# Patient Record
Sex: MALE | Race: WHITE | NOT HISPANIC OR LATINO | Employment: FULL TIME | URBAN - METROPOLITAN AREA
[De-identification: names, ages, dates, MRNs, and addresses within clinical notes are randomized per-mention and may not be internally consistent; named-entity substitution may affect disease eponyms.]

---

## 2023-02-10 PROBLEM — I10 HYPERTENSION: Status: ACTIVE | Noted: 2023-02-10

## 2023-02-10 PROBLEM — R79.89 ABNORMAL CBC: Status: ACTIVE | Noted: 2023-02-10

## 2023-02-10 PROBLEM — Z90.81 HISTORY OF SPLENECTOMY: Status: ACTIVE | Noted: 2023-02-10

## 2023-02-10 PROBLEM — E66.812 OBESITY, CLASS II, BMI 35-39.9, NO COMORBIDITY: Status: ACTIVE | Noted: 2023-02-10

## 2023-02-10 PROBLEM — N28.9 LOW KIDNEY FUNCTION: Status: ACTIVE | Noted: 2023-02-10

## 2023-02-10 PROBLEM — Z91.199 MEDICALLY NONCOMPLIANT: Status: ACTIVE | Noted: 2023-02-10

## 2023-02-10 PROBLEM — B37.0 ORAL THRUSH: Status: ACTIVE | Noted: 2023-02-10

## 2023-02-10 PROBLEM — B35.3 TINEA PEDIS OF BOTH FEET: Status: ACTIVE | Noted: 2023-02-10

## 2023-02-10 PROBLEM — E78.5 HYPERLIPIDEMIA: Status: ACTIVE | Noted: 2023-02-10

## 2023-02-10 PROBLEM — N18.9 CKD (CHRONIC KIDNEY DISEASE): Status: ACTIVE | Noted: 2023-02-10

## 2023-02-10 PROBLEM — H57.89 IRRITATION OF RIGHT EYE: Status: ACTIVE | Noted: 2023-02-10

## 2023-02-10 PROBLEM — L81.4 SKIN SPOTS-AGING: Status: ACTIVE | Noted: 2023-02-10

## 2023-02-10 PROBLEM — N52.9 ERECTILE DYSFUNCTION: Status: ACTIVE | Noted: 2023-02-10

## 2023-02-10 PROBLEM — E66.9 OBESITY, CLASS II, BMI 35-39.9, NO COMORBIDITY: Status: ACTIVE | Noted: 2023-02-10

## 2023-02-10 PROBLEM — B35.6 TINEA CRURIS: Status: ACTIVE | Noted: 2023-02-10

## 2023-02-10 PROBLEM — C85.90 NON-HODGKIN LYMPHOMA (MULTI): Status: ACTIVE | Noted: 2023-02-10

## 2023-02-10 PROBLEM — E55.9 VITAMIN D DEFICIENCY: Status: ACTIVE | Noted: 2023-02-10

## 2023-02-10 PROBLEM — R74.02 ELEVATED LDH: Status: ACTIVE | Noted: 2023-02-10

## 2023-02-10 RX ORDER — CARVEDILOL 25 MG/1
1 TABLET ORAL 2 TIMES DAILY
COMMUNITY
Start: 2014-05-22 | End: 2023-08-18 | Stop reason: SDUPTHER

## 2023-02-10 RX ORDER — LISINOPRIL 20 MG/1
2 TABLET ORAL DAILY
COMMUNITY
Start: 2014-05-22 | End: 2023-08-18 | Stop reason: SDUPTHER

## 2023-02-10 RX ORDER — ATORVASTATIN CALCIUM 40 MG/1
1 TABLET, FILM COATED ORAL NIGHTLY
COMMUNITY
Start: 2020-03-09 | End: 2023-08-18 | Stop reason: SDUPTHER

## 2023-02-10 RX ORDER — DOXAZOSIN 4 MG/1
1 TABLET ORAL DAILY
COMMUNITY
Start: 2014-05-08 | End: 2023-08-18 | Stop reason: SDUPTHER

## 2023-02-10 RX ORDER — CHLORTHALIDONE 25 MG/1
1 TABLET ORAL DAILY
COMMUNITY
Start: 2014-05-22 | End: 2023-08-18 | Stop reason: SDUPTHER

## 2023-02-10 RX ORDER — AMLODIPINE BESYLATE 10 MG/1
1 TABLET ORAL DAILY
COMMUNITY
Start: 2014-05-22 | End: 2023-08-18 | Stop reason: SDUPTHER

## 2023-04-03 ENCOUNTER — OFFICE VISIT (OUTPATIENT)
Dept: PRIMARY CARE | Facility: CLINIC | Age: 63
End: 2023-04-03
Payer: COMMERCIAL

## 2023-04-03 VITALS
SYSTOLIC BLOOD PRESSURE: 130 MMHG | RESPIRATION RATE: 16 BRPM | HEART RATE: 69 BPM | TEMPERATURE: 97.8 F | OXYGEN SATURATION: 95 % | DIASTOLIC BLOOD PRESSURE: 80 MMHG

## 2023-04-03 DIAGNOSIS — I10 PRIMARY HYPERTENSION: Primary | ICD-10-CM

## 2023-04-03 DIAGNOSIS — E55.9 VITAMIN D DEFICIENCY: ICD-10-CM

## 2023-04-03 DIAGNOSIS — I10 HYPERTENSION, UNSPECIFIED TYPE: ICD-10-CM

## 2023-04-03 LAB
ANION GAP IN SER/PLAS: 14 MMOL/L (ref 10–20)
CALCIUM (MG/DL) IN SER/PLAS: 8.8 MG/DL (ref 8.6–10.3)
CARBON DIOXIDE, TOTAL (MMOL/L) IN SER/PLAS: 25 MMOL/L (ref 21–32)
CHLORIDE (MMOL/L) IN SER/PLAS: 101 MMOL/L (ref 98–107)
CREATININE (MG/DL) IN SER/PLAS: 1.27 MG/DL (ref 0.5–1.3)
GFR MALE: 64 ML/MIN/1.73M2
GLUCOSE (MG/DL) IN SER/PLAS: 91 MG/DL (ref 74–99)
POTASSIUM (MMOL/L) IN SER/PLAS: 3.7 MMOL/L (ref 3.5–5.3)
SODIUM (MMOL/L) IN SER/PLAS: 136 MMOL/L (ref 136–145)
UREA NITROGEN (MG/DL) IN SER/PLAS: 22 MG/DL (ref 6–23)

## 2023-04-03 PROCEDURE — 36415 COLL VENOUS BLD VENIPUNCTURE: CPT | Performed by: NURSE PRACTITIONER

## 2023-04-03 PROCEDURE — 99213 OFFICE O/P EST LOW 20 MIN: CPT | Performed by: NURSE PRACTITIONER

## 2023-04-03 PROCEDURE — 80048 BASIC METABOLIC PNL TOTAL CA: CPT

## 2023-04-03 PROCEDURE — 4004F PT TOBACCO SCREEN RCVD TLK: CPT | Performed by: NURSE PRACTITIONER

## 2023-04-03 PROCEDURE — 3079F DIAST BP 80-89 MM HG: CPT | Performed by: NURSE PRACTITIONER

## 2023-04-03 PROCEDURE — 82306 VITAMIN D 25 HYDROXY: CPT

## 2023-04-03 PROCEDURE — 3075F SYST BP GE 130 - 139MM HG: CPT | Performed by: NURSE PRACTITIONER

## 2023-04-03 NOTE — ASSESSMENT & PLAN NOTE
Well-controlled on amlodipine 10 mg daily, lisinopril 40 mg daily, carvedilol 25 mg twice daily, and chlorthalidone 25 mg daily, check BMP today

## 2023-04-03 NOTE — PROGRESS NOTES
Subjective   Patient ID: Fred Menendez is a 62 y.o. male who presents for Follow-up and Annual Exam.  HPI    Patient presents today for 6-month follow-up for chronic disease management.  He reports he is take his medication as prescribed, denies any side effects.  He does report that he has had increased fatigue over the last several weeks.  He states that he took a new position at work with more responsibility, gets up for work at 3:30 AM and often is not in bed before 9 or 10 PM.  He states that he is sleeping well at night, denies any issues falling or staying asleep.  He denies any chest pains or palpitations, no headache or dizziness, no blurry vision.      Review of Systems  as noted in HPI      /80 (BP Location: Left arm, Patient Position: Sitting, BP Cuff Size: Adult)   Pulse 69   Temp 36.6 °C (97.8 °F) (Temporal)   Resp 16   SpO2 95%      Objective   Physical Exam  Vitals reviewed.   Cardiovascular:      Rate and Rhythm: Normal rate and regular rhythm.      Heart sounds: Normal heart sounds.   Pulmonary:      Effort: Pulmonary effort is normal.      Breath sounds: Normal breath sounds.   Musculoskeletal:      Right lower leg: No edema.      Left lower leg: No edema.   Neurological:      Mental Status: He is alert.         Health Maintenance Due   Topic Date Due    Yearly Adult Physical  Never done    HIV Screening  Never done    Colorectal Cancer Screening  Never done    COVID-19 Vaccine (1) Never done    MMR Vaccines (1 of 1 - Standard series) Never done    HIB Vaccines (1 of 1 - Risk 1-dose series) Never done    Meningococcal Vaccine (1 - Risk 2-dose series) Never done    Hepatitis C Screening  Never done    Diabetes Screening  Never done    Zoster Vaccines (1 of 2) Never done    Pneumococcal Vaccine: Pediatrics (0 to 5 Years) and At-Risk Patients (6 to 64 Years) (3 - PCV) 08/21/2021        Assessment/Plan   Problem List Items Addressed This Visit          Circulatory    Hypertension - Primary      Well-controlled on amlodipine 10 mg daily, lisinopril 40 mg daily, carvedilol 25 mg twice daily, and chlorthalidone 25 mg daily, check BMP today          Relevant Orders    Basic metabolic panel       Endocrine/Metabolic    Vitamin D deficiency    Relevant Orders    Vitamin D 25-Hydroxy,Total            Sherlyn Mckee, APRN-CNP

## 2023-04-04 LAB — CALCIDIOL (25 OH VITAMIN D3) (NG/ML) IN SER/PLAS: 24 NG/ML

## 2023-08-18 ENCOUNTER — OFFICE VISIT (OUTPATIENT)
Dept: PRIMARY CARE | Facility: CLINIC | Age: 63
End: 2023-08-18
Payer: COMMERCIAL

## 2023-08-18 VITALS
BODY MASS INDEX: 36.39 KG/M2 | WEIGHT: 218.38 LBS | SYSTOLIC BLOOD PRESSURE: 132 MMHG | RESPIRATION RATE: 16 BRPM | DIASTOLIC BLOOD PRESSURE: 78 MMHG | OXYGEN SATURATION: 95 % | HEART RATE: 66 BPM | HEIGHT: 65 IN

## 2023-08-18 DIAGNOSIS — I10 HYPERTENSION, UNSPECIFIED TYPE: ICD-10-CM

## 2023-08-18 DIAGNOSIS — F17.210 CIGARETTE NICOTINE DEPENDENCE WITHOUT COMPLICATION: Primary | ICD-10-CM

## 2023-08-18 DIAGNOSIS — E78.2 MODERATE MIXED HYPERLIPIDEMIA NOT REQUIRING STATIN THERAPY: ICD-10-CM

## 2023-08-18 DIAGNOSIS — Z76.89 ESTABLISHING CARE WITH NEW DOCTOR, ENCOUNTER FOR: ICD-10-CM

## 2023-08-18 DIAGNOSIS — Z00.00 WELLNESS EXAMINATION: ICD-10-CM

## 2023-08-18 PROBLEM — C85.90 NON-HODGKIN LYMPHOMA (MULTI): Status: RESOLVED | Noted: 2023-02-10 | Resolved: 2023-08-18

## 2023-08-18 PROCEDURE — 3075F SYST BP GE 130 - 139MM HG: CPT

## 2023-08-18 PROCEDURE — 99396 PREV VISIT EST AGE 40-64: CPT

## 2023-08-18 PROCEDURE — 3078F DIAST BP <80 MM HG: CPT

## 2023-08-18 PROCEDURE — 4004F PT TOBACCO SCREEN RCVD TLK: CPT

## 2023-08-18 RX ORDER — AMLODIPINE BESYLATE 10 MG/1
10 TABLET ORAL DAILY
Qty: 90 TABLET | Refills: 3 | Status: SHIPPED | OUTPATIENT
Start: 2023-08-18 | End: 2024-08-17

## 2023-08-18 RX ORDER — LISINOPRIL 20 MG/1
40 TABLET ORAL DAILY
Qty: 180 TABLET | Refills: 3 | Status: SHIPPED | OUTPATIENT
Start: 2023-08-18 | End: 2024-08-17

## 2023-08-18 RX ORDER — ATORVASTATIN CALCIUM 40 MG/1
40 TABLET, FILM COATED ORAL NIGHTLY
Qty: 90 TABLET | Refills: 3 | Status: SHIPPED | OUTPATIENT
Start: 2023-08-18 | End: 2024-08-17

## 2023-08-18 RX ORDER — CARVEDILOL 25 MG/1
25 TABLET ORAL 2 TIMES DAILY
Qty: 180 TABLET | Refills: 3 | Status: SHIPPED | OUTPATIENT
Start: 2023-08-18 | End: 2024-08-17

## 2023-08-18 RX ORDER — CHLORTHALIDONE 25 MG/1
25 TABLET ORAL DAILY
Qty: 90 TABLET | Refills: 3 | Status: SHIPPED | OUTPATIENT
Start: 2023-08-18 | End: 2024-08-17

## 2023-08-18 RX ORDER — DOXAZOSIN 4 MG/1
4 TABLET ORAL DAILY
Qty: 90 TABLET | Refills: 3 | Status: SHIPPED | OUTPATIENT
Start: 2023-08-18 | End: 2024-08-17

## 2023-08-18 ASSESSMENT — ENCOUNTER SYMPTOMS
FREQUENCY: 0
SORE THROAT: 0
JOINT SWELLING: 0
NERVOUS/ANXIOUS: 0
BLURRED VISION: 0
COUGH: 0
HYPERACTIVE: 0
MYALGIAS: 0
EYE ITCHING: 0
DIZZINESS: 0
ORTHOPNEA: 0
DIARRHEA: 0
HEADACHES: 0
HYPERTENSION: 1
PND: 0
CONSTIPATION: 0
HEMATURIA: 0
SWEATS: 0
VOMITING: 0
WHEEZING: 0
AGITATION: 0
EYE PAIN: 0
SINUS PAIN: 0
ABDOMINAL PAIN: 0
VOICE CHANGE: 0
BLOOD IN STOOL: 0
CHEST TIGHTNESS: 0
SLEEP DISTURBANCE: 0
BACK PAIN: 0
SHORTNESS OF BREATH: 0
DYSPHORIC MOOD: 0
FEVER: 0
SPEECH DIFFICULTY: 0
DIFFICULTY URINATING: 0
WEAKNESS: 0
NUMBNESS: 0
WOUND: 0
FATIGUE: 0
NECK PAIN: 0
PALPITATIONS: 0
RHINORRHEA: 0
SINUS PRESSURE: 0
EYE DISCHARGE: 0

## 2023-08-18 ASSESSMENT — PATIENT HEALTH QUESTIONNAIRE - PHQ9
1. LITTLE INTEREST OR PLEASURE IN DOING THINGS: NOT AT ALL
2. FEELING DOWN, DEPRESSED OR HOPELESS: NOT AT ALL
SUM OF ALL RESPONSES TO PHQ9 QUESTIONS 1 AND 2: 0

## 2023-08-18 ASSESSMENT — COLUMBIA-SUICIDE SEVERITY RATING SCALE - C-SSRS
2. HAVE YOU ACTUALLY HAD ANY THOUGHTS OF KILLING YOURSELF?: NO
6. HAVE YOU EVER DONE ANYTHING, STARTED TO DO ANYTHING, OR PREPARED TO DO ANYTHING TO END YOUR LIFE?: NO
1. IN THE PAST MONTH, HAVE YOU WISHED YOU WERE DEAD OR WISHED YOU COULD GO TO SLEEP AND NOT WAKE UP?: NO

## 2023-08-18 NOTE — PATIENT INSTRUCTIONS
Today we spend 3 minutes discussing that smoking reduces your immune system, your ability to heal, thins your bones, increases your risk of cancer (lung, bladder, breast, cervical, etc.), increases your risk of lung disease (COPD and emphysema), and greatly increases your risk of heart attack and stroke, as well as affects those around you (increased ear infections and asthma in children of smokers).2) We briefly discussed options to increase chance of quitting, including nicotine replacement, Chantix (varenicline tartrate) and Zyban (buproprion hydrochloride). 3) Patient is not currently interested in quitting. Will address again at next visit. 4) Please visit CDC.gov/Tobacco for more details.    Vaccines are important!  Yearly seasonal flu shots are recommended, high dose is indicated for patient over 65.   - Tetanus boosters should be given every 10 yrs, this also covers for diphtheria and pertussis.   - most insurances cover the 2-shot series for shingles (shingrix) after the age of 60.   - Pneumonia vaccines are given routinely at age 65, however is you have a chronic heart or lung diagnosis this may be given earlier.     Preventative cancer screens SAVE LIVES!  - Prostate cancer screening is included in yearly blood work in men over 40.   - Colon cancer screening is recommended at age  for all patients, sometimes sooner based on family history.   - other tests may be recommended if you are a smoker!     150 mins of aerobic exercise is advised for good cardiovascular health and maintain a healthy weight.     Please try to work on maintaining a healthy body weight, with a BMI close to or at a BMI 19-25.    Recommend a whole-foods, plant-based diet, filled with fresh fruits, vegetables, seeds, beans, nuts and berries.    Discussed smoking cessation/Abstinence is best.      Abstaining from the use of alcohol is best. However if you choose to drink current guidelines are 2 or less drinks per day for men and 1.5 or  less per day for women (and not all saved for one night on the weekend).    Your blood pressure should be BELOW 135/85.  If your readings were high today, please consider an at home blood pressure monitor to keep a log to bring with you to your next appointment.     OF course, do not text and drive and always wear a seat belt.

## 2023-08-18 NOTE — PROGRESS NOTES
Subjective   Patient ID: Fred Menendez is a 62 y.o. male who presents for Establish Care, Pre-op Exam, and Annual Exam.    Pt is here for annual wellness.  Reports overall health is well, need eye surgery    Do you take any herbs or supplements that were not prescribed by a doctor? Yes hemp extract daily for pain  Are you taking calcium supplements? no  Are you taking aspirin daily? no  Colonoscopy: 5/1/2020 10yr followup  Fasting blood work: 4/23  Last eye exam: 1/23  Last dental Exam: unkown  Exercise:none  Mood:pleasant  Sleep: pretty good  Diet:  could be better  Occupation:  [employer]   Do you have pain that bothers you in your daily life? yes    1. Patient Counseling:  --Nutrition: Stressed importance of moderation in sodium/caffeine intake, saturated fat and cholesterol, caloric balance, sufficient intake of fresh fruits, vegetables, fiber, calcium, iron, and 1 mg of folate supplement per day (for females capable of pregnancy).  --Discussed the issue of estrogen replacement, calcium supplement, and the daily use of baby aspirin.  --Exercise: Stressed the importance of regular exercise.   --Substance Abuse: Discussed cessation/primary prevention of tobacco, alcohol, or other drug use; driving or other dangerous activities under the influence; availability of treatment for abuse.    --Sexuality: Discussed sexually transmitted diseases, partner selection, use of condoms, avoidance of unintended pregnancy  and contraceptive alternatives.   --Injury prevention: Discussed safety belts, safety helmets, smoke detector, smoking near bedding or upholstery.   --Dental health: Discussed importance of regular tooth brushing, flossing, and dental visits.  --Immunizations reviewed.  --Discussed benefits of screening colonoscopy.  --After hours service discussed with patient  2 Discussed the patient's BMI with her.  The BMI is above average. The patient received Provided instructions on dietary  changes  Provided instructions on exercise because they have an above normal BMI.  3 Follow up as needed for acute illness    Subjective  Fred Menendez is a 62 y.o. male who presents to the office today for a preoperative consultation at the request of surgeon Dr sandy who plans on performing eye suregy on unknown unknown. This consultation is requested for the specific conditions prompting preoperative evaluation (i.e. because of potential effect on operative risk): htn, smoking. Planned anesthesia: IV sedation. The patient has the following known anesthesia issues: none. Patients bleeding risk: no recent abnormal bleeding. Patient does not have objections to receiving blood products if needed.    Predictors of intubation difficulty:   Morbid obesity? no   Anatomically abnormal facies? no   Prominent incisors? no   Receding mandible? no   Short, thick neck? no   Neck range of motion: normal    Current medications which may produce withdrawal symptoms if withheld perioperatively: none            Hypertension  This is a chronic problem. The current episode started more than 1 year ago (22yrs brain). The problem has been waxing and waning since onset. The problem is controlled. Pertinent negatives include no anxiety, blurred vision, chest pain, headaches, malaise/fatigue, neck pain, orthopnea, palpitations, peripheral edema, PND, shortness of breath or sweats. There are no associated agents to hypertension. Risk factors for coronary artery disease include male gender, stress, obesity and smoking/tobacco exposure. Past treatments include ACE inhibitors, diuretics and calcium channel blockers. The current treatment provides significant improvement. There are no compliance problems.         Review of Systems   Constitutional:  Negative for fatigue, fever and malaise/fatigue.   HENT:  Negative for congestion, ear discharge, ear pain, nosebleeds, postnasal drip, rhinorrhea, sinus pressure, sinus pain, sore throat,  "tinnitus and voice change.    Eyes:  Negative for blurred vision, pain, discharge and itching.   Respiratory:  Negative for cough, chest tightness, shortness of breath and wheezing.    Cardiovascular:  Negative for chest pain, palpitations, orthopnea, leg swelling and PND.   Gastrointestinal:  Negative for abdominal pain, blood in stool, constipation, diarrhea and vomiting.   Endocrine: Negative for cold intolerance, heat intolerance and polyuria.   Genitourinary:  Negative for difficulty urinating, frequency, hematuria, penile pain, penile swelling, scrotal swelling, testicular pain and urgency.   Musculoskeletal:  Negative for back pain, gait problem, joint swelling, myalgias and neck pain.   Skin:  Negative for rash and wound.   Neurological:  Negative for dizziness, speech difficulty, weakness, numbness and headaches.   Psychiatric/Behavioral:  Negative for agitation, dysphoric mood and sleep disturbance. The patient is not nervous/anxious and is not hyperactive.        Objective   /78   Pulse 66   Resp 16   Ht 1.651 m (5' 5\")   Wt 99.1 kg (218 lb 6 oz)   SpO2 95%   BMI 36.34 kg/m²     Physical Exam  Constitutional:       Appearance: Normal appearance.   HENT:      Head: Normocephalic and atraumatic.      Right Ear: Tympanic membrane and external ear normal.      Left Ear: Tympanic membrane and external ear normal.      Nose: Nose normal.      Mouth/Throat:      Mouth: Mucous membranes are moist.      Pharynx: Oropharynx is clear. Uvula midline.   Eyes:      General: Lids are normal.      Extraocular Movements: Extraocular movements intact.      Pupils: Pupils are equal, round, and reactive to light.   Neck:      Thyroid: No thyromegaly or thyroid tenderness.   Cardiovascular:      Rate and Rhythm: Normal rate and regular rhythm.      Heart sounds: Normal heart sounds.   Pulmonary:      Effort: Pulmonary effort is normal.      Breath sounds: Normal breath sounds.   Abdominal:      General: Bowel " sounds are normal.      Palpations: Abdomen is soft.      Tenderness: There is no abdominal tenderness. There is no guarding.   Musculoskeletal:         General: No swelling. Normal range of motion.      Cervical back: Normal range of motion.      Right lower leg: No edema.      Left lower leg: No edema.   Lymphadenopathy:      Head:      Right side of head: No submental, submandibular or tonsillar adenopathy.      Left side of head: No submental, submandibular or tonsillar adenopathy.      Cervical: No cervical adenopathy.   Skin:     General: Skin is warm and dry.      Capillary Refill: Capillary refill takes less than 2 seconds.      Coloration: Skin is not jaundiced.      Findings: No lesion or rash.   Neurological:      General: No focal deficit present.      Mental Status: He is alert and oriented to person, place, and time.   Psychiatric:         Mood and Affect: Mood normal.         Behavior: Behavior normal.         Thought Content: Thought content normal.         Judgment: Judgment normal.       Assessment/Plan   Problem List Items Addressed This Visit       Hyperlipidemia    Relevant Medications    atorvastatin (Lipitor) 40 mg tablet    Hypertension    Relevant Medications    lisinopril 20 mg tablet    amLODIPine (Norvasc) 10 mg tablet    chlorthalidone (Hygroton) 25 mg tablet    doxazosin (Cardura) 4 mg tablet    carvedilol (Coreg) 25 mg tablet     Other Visit Diagnoses       Cigarette nicotine dependence without complication    -  Primary    Relevant Orders    CT lung screening low dose    Wellness examination        Establishing care with new doctor, encounter for            Pt is medically cleared to have eye surgery

## 2023-08-31 ENCOUNTER — TELEPHONE (OUTPATIENT)
Dept: PRIMARY CARE | Facility: CLINIC | Age: 63
End: 2023-08-31
Payer: COMMERCIAL

## 2023-08-31 NOTE — TELEPHONE ENCOUNTER
Odette called in to confirm if Pt's paperwork was faxed over paperwork to eye center for surgery.    Confirmed with Odette that paperwork was faxed on 3/29/2023.

## 2024-01-12 ENCOUNTER — HOSPITAL ENCOUNTER (OUTPATIENT)
Facility: HOSPITAL | Age: 64
Setting detail: OBSERVATION
Discharge: HOME | DRG: 175 | End: 2024-01-14
Attending: EMERGENCY MEDICINE | Admitting: STUDENT IN AN ORGANIZED HEALTH CARE EDUCATION/TRAINING PROGRAM
Payer: COMMERCIAL

## 2024-01-12 ENCOUNTER — APPOINTMENT (OUTPATIENT)
Dept: RADIOLOGY | Facility: HOSPITAL | Age: 64
DRG: 175 | End: 2024-01-12
Payer: COMMERCIAL

## 2024-01-12 ENCOUNTER — APPOINTMENT (OUTPATIENT)
Dept: CARDIOLOGY | Facility: HOSPITAL | Age: 64
DRG: 175 | End: 2024-01-12
Payer: COMMERCIAL

## 2024-01-12 DIAGNOSIS — I26.99 PE (PULMONARY THROMBOEMBOLISM) (MULTI): Primary | ICD-10-CM

## 2024-01-12 DIAGNOSIS — J18.9 PNEUMONIA OF LEFT UPPER LOBE DUE TO INFECTIOUS ORGANISM: ICD-10-CM

## 2024-01-12 PROBLEM — J96.01 ACUTE HYPOXIC RESPIRATORY FAILURE (MULTI): Status: ACTIVE | Noted: 2024-01-12

## 2024-01-12 LAB
ALBUMIN SERPL BCP-MCNC: 3.3 G/DL (ref 3.4–5)
ALP SERPL-CCNC: 79 U/L (ref 33–136)
ALT SERPL W P-5'-P-CCNC: 42 U/L (ref 10–52)
ANION GAP SERPL CALC-SCNC: 14 MMOL/L (ref 10–20)
AST SERPL W P-5'-P-CCNC: 45 U/L (ref 9–39)
BASE EXCESS BLDV CALC-SCNC: 4.3 MMOL/L (ref -2–3)
BASOPHILS # BLD AUTO: 0.03 X10*3/UL (ref 0–0.1)
BASOPHILS NFR BLD AUTO: 0.1 %
BILIRUB SERPL-MCNC: 1.4 MG/DL (ref 0–1.2)
BNP SERPL-MCNC: 247 PG/ML (ref 0–99)
BODY TEMPERATURE: 37 DEGREES CELSIUS
BUN SERPL-MCNC: 43 MG/DL (ref 6–23)
CALCIUM SERPL-MCNC: 8.9 MG/DL (ref 8.6–10.3)
CARDIAC TROPONIN I PNL SERPL HS: 43 NG/L (ref 0–20)
CHLORIDE SERPL-SCNC: 99 MMOL/L (ref 98–107)
CO2 SERPL-SCNC: 26 MMOL/L (ref 21–32)
CREAT SERPL-MCNC: 1.79 MG/DL (ref 0.5–1.3)
EGFRCR SERPLBLD CKD-EPI 2021: 42 ML/MIN/1.73M*2
EOSINOPHIL # BLD AUTO: 0.04 X10*3/UL (ref 0–0.7)
EOSINOPHIL NFR BLD AUTO: 0.2 %
ERYTHROCYTE [DISTWIDTH] IN BLOOD BY AUTOMATED COUNT: 14.5 % (ref 11.5–14.5)
FLUAV RNA RESP QL NAA+PROBE: NOT DETECTED
FLUBV RNA RESP QL NAA+PROBE: NOT DETECTED
GLUCOSE SERPL-MCNC: 126 MG/DL (ref 74–99)
HCO3 BLDV-SCNC: 27.3 MMOL/L (ref 22–26)
HCT VFR BLD AUTO: 40.9 % (ref 41–52)
HGB BLD-MCNC: 14.4 G/DL (ref 13.5–17.5)
HOLD SPECIMEN: NORMAL
IMM GRANULOCYTES # BLD AUTO: 0.15 X10*3/UL (ref 0–0.7)
IMM GRANULOCYTES NFR BLD AUTO: 0.7 % (ref 0–0.9)
INHALED O2 CONCENTRATION: 21 %
LYMPHOCYTES # BLD AUTO: 1.86 X10*3/UL (ref 1.2–4.8)
LYMPHOCYTES NFR BLD AUTO: 9.3 %
MCH RBC QN AUTO: 32 PG (ref 26–34)
MCHC RBC AUTO-ENTMCNC: 35.2 G/DL (ref 32–36)
MCV RBC AUTO: 91 FL (ref 80–100)
MONOCYTES # BLD AUTO: 2 X10*3/UL (ref 0.1–1)
MONOCYTES NFR BLD AUTO: 10 %
NEUTROPHILS # BLD AUTO: 15.93 X10*3/UL (ref 1.2–7.7)
NEUTROPHILS NFR BLD AUTO: 79.7 %
NRBC BLD-RTO: 0 /100 WBCS (ref 0–0)
OXYHGB MFR BLDV: 82.7 % (ref 45–75)
PCO2 BLDV: 35 MM HG (ref 41–51)
PH BLDV: 7.5 PH (ref 7.33–7.43)
PLATELET # BLD AUTO: 154 X10*3/UL (ref 150–450)
PO2 BLDV: 51 MM HG (ref 35–45)
POTASSIUM SERPL-SCNC: 3.3 MMOL/L (ref 3.5–5.3)
PROT SERPL-MCNC: 7.2 G/DL (ref 6.4–8.2)
RBC # BLD AUTO: 4.5 X10*6/UL (ref 4.5–5.9)
RSV RNA RESP QL NAA+PROBE: NOT DETECTED
SAO2 % BLDV: 86 % (ref 45–75)
SARS-COV-2 RNA RESP QL NAA+PROBE: NOT DETECTED
SODIUM SERPL-SCNC: 136 MMOL/L (ref 136–145)
UFH PPP CHRO-ACNC: 0.5 IU/ML
UFH PPP CHRO-ACNC: 0.7 IU/ML
WBC # BLD AUTO: 20 X10*3/UL (ref 4.4–11.3)

## 2024-01-12 PROCEDURE — 87040 BLOOD CULTURE FOR BACTERIA: CPT | Mod: GEALAB | Performed by: PHYSICIAN ASSISTANT

## 2024-01-12 PROCEDURE — 85025 COMPLETE CBC W/AUTO DIFF WBC: CPT | Performed by: PHYSICIAN ASSISTANT

## 2024-01-12 PROCEDURE — 71275 CT ANGIOGRAPHY CHEST: CPT | Mod: FOREIGN READ | Performed by: RADIOLOGY

## 2024-01-12 PROCEDURE — G0378 HOSPITAL OBSERVATION PER HR: HCPCS

## 2024-01-12 PROCEDURE — 96374 THER/PROPH/DIAG INJ IV PUSH: CPT | Mod: 59

## 2024-01-12 PROCEDURE — 2550000001 HC RX 255 CONTRASTS: Performed by: PHYSICIAN ASSISTANT

## 2024-01-12 PROCEDURE — 2500000001 HC RX 250 WO HCPCS SELF ADMINISTERED DRUGS (ALT 637 FOR MEDICARE OP): Performed by: STUDENT IN AN ORGANIZED HEALTH CARE EDUCATION/TRAINING PROGRAM

## 2024-01-12 PROCEDURE — 99285 EMERGENCY DEPT VISIT HI MDM: CPT | Performed by: EMERGENCY MEDICINE

## 2024-01-12 PROCEDURE — 99223 1ST HOSP IP/OBS HIGH 75: CPT | Performed by: STUDENT IN AN ORGANIZED HEALTH CARE EDUCATION/TRAINING PROGRAM

## 2024-01-12 PROCEDURE — 83880 ASSAY OF NATRIURETIC PEPTIDE: CPT | Performed by: PHYSICIAN ASSISTANT

## 2024-01-12 PROCEDURE — 2500000002 HC RX 250 W HCPCS SELF ADMINISTERED DRUGS (ALT 637 FOR MEDICARE OP, ALT 636 FOR OP/ED): Performed by: STUDENT IN AN ORGANIZED HEALTH CARE EDUCATION/TRAINING PROGRAM

## 2024-01-12 PROCEDURE — 85520 HEPARIN ASSAY: CPT | Performed by: STUDENT IN AN ORGANIZED HEALTH CARE EDUCATION/TRAINING PROGRAM

## 2024-01-12 PROCEDURE — 1200000002 HC GENERAL ROOM WITH TELEMETRY DAILY

## 2024-01-12 PROCEDURE — 2500000004 HC RX 250 GENERAL PHARMACY W/ HCPCS (ALT 636 FOR OP/ED)

## 2024-01-12 PROCEDURE — 80053 COMPREHEN METABOLIC PANEL: CPT | Performed by: PHYSICIAN ASSISTANT

## 2024-01-12 PROCEDURE — 82805 BLOOD GASES W/O2 SATURATION: CPT | Performed by: PHYSICIAN ASSISTANT

## 2024-01-12 PROCEDURE — 2500000004 HC RX 250 GENERAL PHARMACY W/ HCPCS (ALT 636 FOR OP/ED): Performed by: STUDENT IN AN ORGANIZED HEALTH CARE EDUCATION/TRAINING PROGRAM

## 2024-01-12 PROCEDURE — 2500000004 HC RX 250 GENERAL PHARMACY W/ HCPCS (ALT 636 FOR OP/ED): Performed by: PHYSICIAN ASSISTANT

## 2024-01-12 PROCEDURE — 93005 ELECTROCARDIOGRAM TRACING: CPT

## 2024-01-12 PROCEDURE — 2500000001 HC RX 250 WO HCPCS SELF ADMINISTERED DRUGS (ALT 637 FOR MEDICARE OP): Performed by: PHYSICIAN ASSISTANT

## 2024-01-12 PROCEDURE — 84484 ASSAY OF TROPONIN QUANT: CPT | Performed by: PHYSICIAN ASSISTANT

## 2024-01-12 PROCEDURE — 96365 THER/PROPH/DIAG IV INF INIT: CPT

## 2024-01-12 PROCEDURE — 71046 X-RAY EXAM CHEST 2 VIEWS: CPT | Mod: FOREIGN READ | Performed by: RADIOLOGY

## 2024-01-12 PROCEDURE — 87637 SARSCOV2&INF A&B&RSV AMP PRB: CPT | Performed by: PHYSICIAN ASSISTANT

## 2024-01-12 PROCEDURE — 96375 TX/PRO/DX INJ NEW DRUG ADDON: CPT

## 2024-01-12 PROCEDURE — 71046 X-RAY EXAM CHEST 2 VIEWS: CPT | Mod: FR

## 2024-01-12 PROCEDURE — 96367 TX/PROPH/DG ADDL SEQ IV INF: CPT

## 2024-01-12 PROCEDURE — S4991 NICOTINE PATCH NONLEGEND: HCPCS | Performed by: STUDENT IN AN ORGANIZED HEALTH CARE EDUCATION/TRAINING PROGRAM

## 2024-01-12 PROCEDURE — 71275 CT ANGIOGRAPHY CHEST: CPT | Mod: FR

## 2024-01-12 PROCEDURE — 36415 COLL VENOUS BLD VENIPUNCTURE: CPT | Performed by: PHYSICIAN ASSISTANT

## 2024-01-12 PROCEDURE — A4217 STERILE WATER/SALINE, 500 ML: HCPCS | Performed by: STUDENT IN AN ORGANIZED HEALTH CARE EDUCATION/TRAINING PROGRAM

## 2024-01-12 RX ORDER — HEPARIN SODIUM 5000 [USP'U]/ML
3000-6000 INJECTION, SOLUTION INTRAVENOUS; SUBCUTANEOUS EVERY 4 HOURS PRN
Status: DISCONTINUED | OUTPATIENT
Start: 2024-01-12 | End: 2024-01-14

## 2024-01-12 RX ORDER — CEFTRIAXONE 2 G/50ML
2 INJECTION, SOLUTION INTRAVENOUS EVERY 24 HOURS
Status: DISCONTINUED | OUTPATIENT
Start: 2024-01-12 | End: 2024-01-14 | Stop reason: HOSPADM

## 2024-01-12 RX ORDER — ACETAMINOPHEN 500 MG
5 TABLET ORAL NIGHTLY PRN
Status: DISCONTINUED | OUTPATIENT
Start: 2024-01-12 | End: 2024-01-14 | Stop reason: HOSPADM

## 2024-01-12 RX ORDER — CARVEDILOL 25 MG/1
25 TABLET ORAL 2 TIMES DAILY
Status: DISCONTINUED | OUTPATIENT
Start: 2024-01-12 | End: 2024-01-14 | Stop reason: HOSPADM

## 2024-01-12 RX ORDER — AMOXICILLIN 250 MG
2 CAPSULE ORAL NIGHTLY PRN
Status: DISCONTINUED | OUTPATIENT
Start: 2024-01-12 | End: 2024-01-14 | Stop reason: HOSPADM

## 2024-01-12 RX ORDER — DOXAZOSIN 4 MG/1
4 TABLET ORAL DAILY
Status: DISCONTINUED | OUTPATIENT
Start: 2024-01-12 | End: 2024-01-14 | Stop reason: HOSPADM

## 2024-01-12 RX ORDER — AMLODIPINE BESYLATE 10 MG/1
10 TABLET ORAL DAILY
Status: DISCONTINUED | OUTPATIENT
Start: 2024-01-12 | End: 2024-01-14 | Stop reason: HOSPADM

## 2024-01-12 RX ORDER — IBUPROFEN 200 MG
TABLET ORAL
Status: DISPENSED
Start: 2024-01-12 | End: 2024-01-13

## 2024-01-12 RX ORDER — POTASSIUM CHLORIDE 1.5 G/1.58G
40 POWDER, FOR SOLUTION ORAL ONCE
Status: COMPLETED | OUTPATIENT
Start: 2024-01-12 | End: 2024-01-12

## 2024-01-12 RX ORDER — CHLORTHALIDONE 25 MG/1
25 TABLET ORAL DAILY
Status: DISCONTINUED | OUTPATIENT
Start: 2024-01-12 | End: 2024-01-14 | Stop reason: HOSPADM

## 2024-01-12 RX ORDER — CEFTRIAXONE 2 G/50ML
INJECTION, SOLUTION INTRAVENOUS
Status: COMPLETED
Start: 2024-01-12 | End: 2024-01-12

## 2024-01-12 RX ORDER — ONDANSETRON HYDROCHLORIDE 2 MG/ML
4 INJECTION, SOLUTION INTRAVENOUS EVERY 6 HOURS PRN
Status: DISCONTINUED | OUTPATIENT
Start: 2024-01-12 | End: 2024-01-14 | Stop reason: HOSPADM

## 2024-01-12 RX ORDER — IBUPROFEN 200 MG
1 TABLET ORAL DAILY
Status: DISCONTINUED | OUTPATIENT
Start: 2024-01-12 | End: 2024-01-13

## 2024-01-12 RX ORDER — HEPARIN SODIUM 10000 [USP'U]/100ML
0-4500 INJECTION, SOLUTION INTRAVENOUS CONTINUOUS
Status: DISCONTINUED | OUTPATIENT
Start: 2024-01-12 | End: 2024-01-14

## 2024-01-12 RX ORDER — FAMOTIDINE 20 MG/1
10 TABLET, FILM COATED ORAL 2 TIMES DAILY PRN
Status: DISCONTINUED | OUTPATIENT
Start: 2024-01-12 | End: 2024-01-14 | Stop reason: HOSPADM

## 2024-01-12 RX ORDER — LISINOPRIL 20 MG/1
40 TABLET ORAL DAILY
Status: DISCONTINUED | OUTPATIENT
Start: 2024-01-12 | End: 2024-01-14 | Stop reason: HOSPADM

## 2024-01-12 RX ORDER — HEPARIN SODIUM 5000 [USP'U]/ML
80 INJECTION, SOLUTION INTRAVENOUS; SUBCUTANEOUS ONCE
Status: COMPLETED | OUTPATIENT
Start: 2024-01-12 | End: 2024-01-12

## 2024-01-12 RX ORDER — ATORVASTATIN CALCIUM 40 MG/1
40 TABLET, FILM COATED ORAL NIGHTLY
Status: DISCONTINUED | OUTPATIENT
Start: 2024-01-12 | End: 2024-01-14 | Stop reason: HOSPADM

## 2024-01-12 RX ADMIN — HEPARIN SODIUM 7500 UNITS: 5000 INJECTION INTRAVENOUS; SUBCUTANEOUS at 12:43

## 2024-01-12 RX ADMIN — IOHEXOL 77 ML: 350 INJECTION, SOLUTION INTRAVENOUS at 11:47

## 2024-01-12 RX ADMIN — HEPARIN SODIUM 1700 UNITS/HR: 10000 INJECTION, SOLUTION INTRAVENOUS at 12:44

## 2024-01-12 RX ADMIN — VANCOMYCIN HYDROCHLORIDE 1250 MG: 10 INJECTION, POWDER, LYOPHILIZED, FOR SOLUTION INTRAVENOUS at 17:00

## 2024-01-12 RX ADMIN — NICOTINE 1 PATCH: 14 PATCH, EXTENDED RELEASE TRANSDERMAL at 15:05

## 2024-01-12 RX ADMIN — HEPARIN SODIUM 1700 UNITS/HR: 10000 INJECTION, SOLUTION INTRAVENOUS at 23:01

## 2024-01-12 RX ADMIN — POTASSIUM CHLORIDE 40 MEQ: 1.5 POWDER, FOR SOLUTION ORAL at 15:21

## 2024-01-12 RX ADMIN — CEFTRIAXONE SODIUM 2 G: 2 INJECTION, SOLUTION INTRAVENOUS at 10:31

## 2024-01-12 RX ADMIN — ATORVASTATIN CALCIUM 40 MG: 40 TABLET, FILM COATED ORAL at 20:29

## 2024-01-12 RX ADMIN — AZITHROMYCIN MONOHYDRATE 500 MG: 500 INJECTION, POWDER, LYOPHILIZED, FOR SOLUTION INTRAVENOUS at 11:12

## 2024-01-12 SDOH — SOCIAL STABILITY: SOCIAL INSECURITY: HAVE YOU HAD THOUGHTS OF HARMING ANYONE ELSE?: NO

## 2024-01-12 SDOH — SOCIAL STABILITY: SOCIAL INSECURITY: DO YOU FEEL UNSAFE GOING BACK TO THE PLACE WHERE YOU ARE LIVING?: NO

## 2024-01-12 SDOH — SOCIAL STABILITY: SOCIAL INSECURITY: ARE YOU OR HAVE YOU BEEN THREATENED OR ABUSED PHYSICALLY, EMOTIONALLY, OR SEXUALLY BY ANYONE?: NO

## 2024-01-12 SDOH — SOCIAL STABILITY: SOCIAL INSECURITY: ABUSE: ADULT

## 2024-01-12 SDOH — SOCIAL STABILITY: SOCIAL INSECURITY: DO YOU FEEL ANYONE HAS EXPLOITED OR TAKEN ADVANTAGE OF YOU FINANCIALLY OR OF YOUR PERSONAL PROPERTY?: NO

## 2024-01-12 SDOH — SOCIAL STABILITY: SOCIAL INSECURITY: ARE THERE ANY APPARENT SIGNS OF INJURIES/BEHAVIORS THAT COULD BE RELATED TO ABUSE/NEGLECT?: NO

## 2024-01-12 SDOH — SOCIAL STABILITY: SOCIAL INSECURITY: HAS ANYONE EVER THREATENED TO HURT YOUR FAMILY OR YOUR PETS?: NO

## 2024-01-12 SDOH — SOCIAL STABILITY: SOCIAL INSECURITY: DOES ANYONE TRY TO KEEP YOU FROM HAVING/CONTACTING OTHER FRIENDS OR DOING THINGS OUTSIDE YOUR HOME?: NO

## 2024-01-12 ASSESSMENT — LIFESTYLE VARIABLES
HAS A RELATIVE, FRIEND, DOCTOR, OR ANOTHER HEALTH PROFESSIONAL EXPRESSED CONCERN ABOUT YOUR DRINKING OR SUGGESTED YOU CUT DOWN: NO
HOW MANY STANDARD DRINKS CONTAINING ALCOHOL DO YOU HAVE ON A TYPICAL DAY: 1 OR 2
SKIP TO QUESTIONS 9-10: 1
HOW OFTEN DO YOU HAVE A DRINK CONTAINING ALCOHOL: 4 OR MORE TIMES A WEEK
HOW OFTEN DURING THE LAST YEAR HAVE YOU NEEDED AN ALCOHOLIC DRINK FIRST THING IN THE MORNING TO GET YOURSELF GOING AFTER A NIGHT OF HEAVY DRINKING: NEVER
AUDIT TOTAL SCORE: 4
HOW OFTEN DO YOU HAVE 6 OR MORE DRINKS ON ONE OCCASION: NEVER
AUDIT TOTAL SCORE: 0
HAVE YOU OR SOMEONE ELSE BEEN INJURED AS A RESULT OF YOUR DRINKING: NO
AUDIT-C TOTAL SCORE: 4
HOW OFTEN DURING THE LAST YEAR HAVE YOU BEEN UNABLE TO REMEMBER WHAT HAPPENED THE NIGHT BEFORE BECAUSE YOU HAD BEEN DRINKING: NEVER
HOW OFTEN DURING THE LAST YEAR HAVE YOU HAD A FEELING OF GUILT OR REMORSE AFTER DRINKING: NEVER
AUDIT-C TOTAL SCORE: 4
HOW OFTEN DURING THE LAST YEAR HAVE YOU FAILED TO DO WHAT WAS NORMALLY EXPECTED FROM YOU BECAUSE OF DRINKING: NEVER
PRESCIPTION_ABUSE_PAST_12_MONTHS: YES
SUBSTANCE_ABUSE_PAST_12_MONTHS: YES

## 2024-01-12 ASSESSMENT — COGNITIVE AND FUNCTIONAL STATUS - GENERAL
DAILY ACTIVITIY SCORE: 24
PATIENT BASELINE BEDBOUND: NO
MOBILITY SCORE: 24
DAILY ACTIVITIY SCORE: 24
MOBILITY SCORE: 24

## 2024-01-12 ASSESSMENT — ACTIVITIES OF DAILY LIVING (ADL)
TOILETING: INDEPENDENT
BATHING: INDEPENDENT
FEEDING YOURSELF: INDEPENDENT
PATIENT'S MEMORY ADEQUATE TO SAFELY COMPLETE DAILY ACTIVITIES?: YES
LACK_OF_TRANSPORTATION: NO
HEARING - RIGHT EAR: FUNCTIONAL
JUDGMENT_ADEQUATE_SAFELY_COMPLETE_DAILY_ACTIVITIES: YES
DRESSING YOURSELF: INDEPENDENT
WALKS IN HOME: INDEPENDENT
ADEQUATE_TO_COMPLETE_ADL: YES
ASSISTIVE_DEVICE: EYEGLASSES
GROOMING: INDEPENDENT
HEARING - LEFT EAR: FUNCTIONAL

## 2024-01-12 ASSESSMENT — PAIN - FUNCTIONAL ASSESSMENT
PAIN_FUNCTIONAL_ASSESSMENT: 0-10

## 2024-01-12 ASSESSMENT — COLUMBIA-SUICIDE SEVERITY RATING SCALE - C-SSRS
6. HAVE YOU EVER DONE ANYTHING, STARTED TO DO ANYTHING, OR PREPARED TO DO ANYTHING TO END YOUR LIFE?: NO
1. IN THE PAST MONTH, HAVE YOU WISHED YOU WERE DEAD OR WISHED YOU COULD GO TO SLEEP AND NOT WAKE UP?: NO
2. HAVE YOU ACTUALLY HAD ANY THOUGHTS OF KILLING YOURSELF?: NO

## 2024-01-12 ASSESSMENT — PAIN SCALES - GENERAL
PAINLEVEL_OUTOF10: 0 - NO PAIN
PAINLEVEL_OUTOF10: 3

## 2024-01-12 ASSESSMENT — PAIN DESCRIPTION - LOCATION: LOCATION: RIB CAGE

## 2024-01-12 ASSESSMENT — PATIENT HEALTH QUESTIONNAIRE - PHQ9
2. FEELING DOWN, DEPRESSED OR HOPELESS: NOT AT ALL
SUM OF ALL RESPONSES TO PHQ9 QUESTIONS 1 & 2: 0
1. LITTLE INTEREST OR PLEASURE IN DOING THINGS: NOT AT ALL

## 2024-01-12 NOTE — PROGRESS NOTES
"Vancomycin Dosing by Pharmacy- INITIAL    Fred Menendez is a 63 y.o. year old male who Pharmacy has been consulted for vancomycin dosing for pneumonia. Based on the patient's indication and renal status this patient will be dosed based on a goal AUC of 400-600.     Renal function is currently stable. Current Scr 1.79; generally trends high.    Visit Vitals  /80   Pulse 90   Temp 37.4 °C (99.3 °F)   Resp 18        Lab Results   Component Value Date    CREATININE 1.79 (H) 01/12/2024    CREATININE 1.27 04/03/2023    CREATININE 1.30 04/22/2022    CREATININE 1.5 04/21/2021    CREATININE 1.72 (H) 03/31/2021        Patient weight is No results found for: \"PTWEIGHT\"    No results found for: \"CULTURE\"     No intake/output data recorded.  [unfilled]    Lab Results   Component Value Date    PATIENTTEMP 37.0 01/12/2024          Assessment/Plan     Patient will not be given a loading dose.  Will initiate vancomycin maintenance,  1250 mg every 24 hours.    This dosing regimen is predicted by [employer] to result in the following pharmacokinetic parameters:    Loading dose: N/A  Regimen: 1250 mg IV every 24 hours.  Start time: 16:23 on 01/12/2024  Exposure target: AUC24 (range)400-600 mg/L.hr   AUC24,ss: 474 mg/L.hr  Probability of AUC24 > 400: 69 %  Ctrough,ss: 14.8 mg/L  Probability of Ctrough,ss > 20: 22 %  Probability of nephrotoxicity (Lodise GENESIS 2009): 10 %      Follow-up level will be ordered on 1.13.24 at 0500 unless clinically indicated sooner.  Will continue to monitor renal function daily while on vancomycin and order serum creatinine at least every 48 hours if not already ordered.  Follow for continued vancomycin needs, clinical response, and signs/symptoms of toxicity.       CHARLIE BOURNE       "

## 2024-01-12 NOTE — PROGRESS NOTES
01/12/24 1352   Discharge Planning   Living Arrangements Alone   Support Systems Family members  (nijoe Kelsey in [facility])   Assistance Needed A&Ox3; independent with ADLs with no assistive devices; drives; room air at baseline currently on 2L NC   Type of Residence Private residence   Number of Stairs to Enter Residence 1   Number of Stairs Within Residence 0   Do you have animals or pets at home? Yes   Type of Animals or Pets dog named 'red'   Who is requesting discharge planning? Provider   Patient expects to be discharged to: probable dc home no needs. Patient denies any home going needs   Does the patient need discharge transport arranged? Yes   RoundTrip coordination needed? Yes   Has discharge transport been arranged? No   Financial Resource Strain   How hard is it for you to pay for the very basics like food, housing, medical care, and heating? Not hard   Housing Stability   In the last 12 months, was there a time when you were not able to pay the mortgage or rent on time? N   In the last 12 months, how many places have you lived? 1   In the last 12 months, was there a time when you did not have a steady place to sleep or slept in a shelter (including now)? N   Transportation Needs   In the past 12 months, has lack of transportation kept you from medical appointments or from getting medications? no   In the past 12 months, has lack of transportation kept you from meetings, work, or from getting things needed for daily living? No

## 2024-01-12 NOTE — ED PROVIDER NOTES
HPI   Chief Complaint   Patient presents with    Shortness of Breath     SOB for about a week. SOB with activity now       Pt is a 63 year old male presenting to the ED with shortness of breath. Pt states this has been going on for 1 week and he experiences it mostly with activity. He can walk 100 feet before he needs to sit down for 10-15 minutes to catch his breath. Pt was satting at 88% upon arrival, currently on 2 L NC satting 97% on exam. He does not wear oxygen at home and does not have a history of lung disease. He is a current smoker and smokes between 0.5- 1 packs per day for the past 30 years. He has an associated cough that is productive that started with the shortness of breath. Pt endorses feeling chest pain along his lower ribs after coughing fits. He has also felt slightly feverish but has not recorded his temperatures. Pt denies swelling, orthopnea, palpitations, wheezing, congestion, sore throat, chills, nausea, vomiting, abdominal pain, and diarrhea. PMHx significant for hypertension and hyperlipidemia.                           [place] Coma Scale Score: 15                  Patient History   Past Medical History:   Diagnosis Date    Male infertility, unspecified     Sterility male    Personal history of diseases of the blood and blood-forming organs and certain disorders involving the immune mechanism 07/09/2015    History of thrombocytopenia    Personal history of other diseases of the nervous system and sense organs 02/28/2020    History of retinal detachment    Personal history of other infectious and parasitic diseases     History of varicella    Personal history of other infectious and parasitic diseases     History of mumps    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     Past Surgical History:   Procedure Laterality Date    OTHER SURGICAL HISTORY  07/09/2015    Excisional Lymph Node Biopsy    SPLENECTOMY, TOTAL  07/09/2015    Splenectomy     Family History   Problem  Relation Name Age of Onset    Diabetes Father      Stroke Father      Heart attack Father       Social History     Tobacco Use    Smoking status: Every Day     Packs/day: 0.50     Years: 30.00     Additional pack years: 0.00     Total pack years: 15.00     Types: Cigarettes     Passive exposure: Current    Smokeless tobacco: Never   Vaping Use    Vaping Use: Never used   Substance Use Topics    Alcohol use: Yes    Drug use: Never       Physical Exam   ED Triage Vitals [01/12/24 0936]   Temp Heart Rate Resp BP   37.4 °C (99.3 °F) 90 18 115/80      SpO2 Temp src Heart Rate Source Patient Position   (!) 88 % -- -- --      BP Location FiO2 (%)     -- --       Physical Exam    ED Course & MDM        Medical Decision Making      Procedure  Procedures

## 2024-01-12 NOTE — PROGRESS NOTES
Pharmacy Medication History Review    Fred Menendez is a 63 y.o. male admitted for PE (pulmonary thromboembolism) (CMS/[facility]). Pharmacy reviewed the patient's [id number] medications and allergies for accuracy.    The list below reflectives the updated PTA list. Please review each medication in order reconciliation for additional clarification and justification.  (Not in a hospital admission)       The list below reflectives the updated allergy list. Please review each documented allergy for additional clarification and justification.  Allergies  Reviewed by Dariel Resendez RN on 1/12/2024   No Known Allergies         Below are additional concerns with the patient's PTA list.      Ute Laird CPhT

## 2024-01-12 NOTE — LETTER
January 14, 2024     Patient: Fred Menendez    YOB: 1960    Date of Admission: 1/12/2024    Date of Discharge: 1/14/2024        To Whom It May Concern:    I took care of Fred Menendez while he was hospitalized at [facility] for medical illness. He will be clear to return to work on light duty on 1/22/2024. Please excuse this absence.       Sincerely,       Donnell Yañez MD   Attending Physician  Hospital Medicine   [facility]  [phone]

## 2024-01-12 NOTE — CARE PLAN
The patient's goals for the shift include Pt will have no increased SOB throughout shift.    The clinical goals for the shift include Get better      Problem: Pain  Goal: My pain/discomfort is manageable  Outcome: Progressing     Problem: Safety  Goal: Patient will be injury free during hospitalization  Outcome: Progressing  Goal: I will remain free of falls  Outcome: Progressing     Problem: Daily Care  Goal: Daily care needs are met  Outcome: Progressing     Problem: Psychosocial Needs  Goal: Demonstrates ability to cope with hospitalization/illness  Outcome: Progressing  Goal: Collaborate with me, my family, and caregiver to identify my specific goals  Outcome: Progressing  Flowsheets (Taken 1/12/2024 9333)  Cultural Requests During Hospitalization: none  Spiritual Requests During Hospitalization: none     Problem: Discharge Barriers  Goal: My discharge needs are met  Outcome: Progressing

## 2024-01-12 NOTE — ED PROVIDER NOTES
HPI   Chief Complaint   Patient presents with    Shortness of Breath     SOB for about a week. SOB with activity now       History of present illness:    Pt is a 63 year old male presenting to the ED with shortness of breath. Pt states this has been going on for 1 week and he experiences it mostly with activity. He can walk 100 feet before he needs to sit down for 10-15 minutes to catch his breath. Pt was satting at 88% upon arrival, currently on 2 L NC satting 97% on exam. He does not wear oxygen at home and does not have a history of lung disease. He is a current smoker and smokes between 0.5- 1 packs per day for the past 30 years. He has an associated cough that is productive that started with the shortness of breath. Pt endorses feeling chest pain along his lower ribs after coughing fits. He has also felt slightly feverish but has not recorded his temperatures. Pt denies swelling, orthopnea, palpitations, wheezing, congestion, sore throat, chills, nausea, vomiting, abdominal pain, and diarrhea. PMHx significant for hypertension, hyperlipidemia and BPH.     Social history: Negative for alcohol and drug use.    Review of systems:   Gen.: No weight loss,  fever.   Eyes: No vision loss, double vision  ENT: No pharyngitis, neck pain  Cardiac: No chest pain, palpitations, syncope  Pulmonary: No hemoptysis.   Heme/lymph: No swollen glands, fever, bleeding.   GI: No abdominal pain, change in bowel habits, melena, hematemesis, hematochezia, nausea, vomiting, diarrhea.   : No discharge, dysuria, frequency, urgency, hematuria.   Musculoskeletal: No limb pain, joint pain, joint swelling.   Skin: No rashes.   Review of systems is otherwise negative unless stated above or in history of present illness.      Physical exam:  General: Vitals noted, no distress. Afebrile.   EENT: No lymphadenopathy appreciated  Cardiac: Regular, rate, rhythm, no murmur.   Pulmonary: Lungs clear bilaterally with good aeration. No adventitious  breath sounds.   Abdomen: Soft, nonsurgical. Nontender. No peritoneal signs. Normoactive bowel sounds.   Extremities: No peripheral edema.   Skin: No rash.   Neuro: No focal neurologic deficits      Medical decision making:   Testing: CBC, CMP, troponin, BNP, blood cultures x 2, chest x-ray, CT scan of chest for PE study: CBC shows leukocytosis with left shift of 20,000 CMP unremarkable troponin is slightly elevated 15 BNP unremarkable blood cultures x 2 was sent chest x-ray shows concern for pneumonia versus possible mass, CT scan of the chest for PE study shows bilateral pulmonary embolisms as well as necrotizing pneumonia but no concern for a mass or malignancy at this time all imaging was interpreted by radiology  Treatment: 2 L oxygen nasal cannula placed upon arrival, azithromycin and ceftriaxone IV started  Reevaluation:   Plan: Pt is a 63 year old male presenting to the ED with shortness of breath. Pt states this has been going on for 1 week and he experiences it mostly with activity. He can walk 100 feet before he needs to sit down for 10-15 minutes to catch his breath. Pt was satting at 88% upon arrival, currently on 2 L NC satting 97% on exam. He does not wear oxygen at home and does not have a history of lung disease. He is a current smoker and smokes between 0.5- 1 packs per day for the past 30 years. He has an associated cough that is productive that started with the shortness of breath. Pt endorses feeling chest pain along his lower ribs after coughing fits. He has also felt slightly feverish but has not recorded his temperatures. Pt denies swelling, orthopnea, palpitations, wheezing, congestion, sore throat, chills, nausea, vomiting, abdominal pain, and diarrhea. PMHx significant for hypertension, hyperlipidemia and BPH.  Pulmonary: Lungs clear bilaterally with good aeration. No adventitious breath sounds. .  No fever appreciated on exam no pain to palpation across the abdomen normal active bowel  sounds.  Pulse ox is at 88% on room air and patient was immediately placed on 2 L of oxygen and is currently at 93% on 2 L.  He is breathing easy at this time and there is no adventitious lung sounds that can be appreciated.  I explained to the patient the lab results as well as the imaging my concern for necrotizing pneumonia need to be admitted at this time.  He was agreeable to this plan was admitted from the hospitalist for further care and treatment.  Impression:   1.  Necrotizing pneumonia            History provided by:  Patient   used: No                        [place] Coma Scale Score: 15                  Patient History   Past Medical History:   Diagnosis Date    Male infertility, unspecified     Sterility male    Personal history of diseases of the blood and blood-forming organs and certain disorders involving the immune mechanism 07/09/2015    History of thrombocytopenia    Personal history of other diseases of the nervous system and sense organs 02/28/2020    History of retinal detachment    Personal history of other infectious and parasitic diseases     History of varicella    Personal history of other infectious and parasitic diseases     History of mumps    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     Past Surgical History:   Procedure Laterality Date    OTHER SURGICAL HISTORY  07/09/2015    Excisional Lymph Node Biopsy    SPLENECTOMY, TOTAL  07/09/2015    Splenectomy     Family History   Problem Relation Name Age of Onset    Diabetes Father      Stroke Father      Heart attack Father       Social History     Tobacco Use    Smoking status: Every Day     Packs/day: 0.50     Years: 30.00     Additional pack years: 0.00     Total pack years: 15.00     Types: Cigarettes     Passive exposure: Current    Smokeless tobacco: Never   Vaping Use    Vaping Use: Never used   Substance Use Topics    Alcohol use: Yes    Drug use: Never       Physical Exam   ED Triage  Vitals [01/12/24 0936]   Temp Heart Rate Resp BP   37.4 °C (99.3 °F) 90 18 115/80      SpO2 Temp src Heart Rate Source Patient Position   (!) 88 % -- -- --      BP Location FiO2 (%)     -- --       Physical Exam    ED Course & [facility]   ED Course as of 01/15/24 1727   Fri Jan 12, 2024   1911 EKG taken at 1013 on January 12, 2024 shows sinus rhythm at 85, T wave inversion and V2 V3, V4 V5 no STEMI present, no Q wave [JF]      ED Course User Index  [JF] Trenton Pickens PA-C         Diagnoses as of 01/15/24 1727   Pneumonia of left upper lobe due to infectious organism       Medical Decision Making      Procedure  Procedures     Trenton Pickens PA-C  01/12/24 1747    -------------------------------------------  This patient was seen by the RACHELLE in a shared visit. I personally saw the patient and made/approved the management plan and take responsibility for the patient management. I reviewed and edited the above documentation where necessary.     I have looked at the EKG and agree with the interpretation.    Kali Chang MD  EM Attending Physician      Kali Chang MD MPH  01/15/24 [id number]

## 2024-01-12 NOTE — H&P
[facility]  Department of Hospital Medicine    HISTORY AND PHYSICAL    Chief Complaint   Patient presents with    Shortness of Breath     SOB for about a week. SOB with activity now        History Of Present Illness  Fred Menendez is a 63 y.o. male with hypertension.  He presented to the ED with 1 week of dyspnea on exertion and productive cough.  He was hypoxic to 88% on room air.  Labs revealed a leukocytosis.  CT angiogram revealed a left-sided cavitary pneumonia and bilateral pulmonary emboli.     He denies any recent immobility, upper respiratory symptoms, GI symptoms, change in appetite.  He had cataract surgery about 3 months prior, no other recent surgeries.  His cough was productive but he did not look at the sputum.  With the cough he did have some left-sided rib pain that is since gone away.  He does not have any pleuritic chest pain or chest pressure.  Denies leg swelling.  Denies history of blood clots.  Endorses a remote history of an infected hematoma in his left arm that had to be surgically drained.     Past Medical History  He has a past medical history of Male infertility, unspecified, Personal history of diseases of the blood and blood-forming organs and certain disorders involving the immune mechanism (07/09/2015), Personal history of other diseases of the nervous system and sense organs (02/28/2020), Personal history of other infectious and parasitic diseases, Personal history of other infectious and parasitic diseases, and Retinal detachment (1986).    Surgical History  He has a past surgical history that includes Other surgical history (07/09/2015) and Splenectomy, total (07/09/2015).     Social History  He reports that he has been smoking cigarettes. He has a 15.00 pack-year smoking history. He has been exposed to tobacco smoke. He has never used smokeless tobacco. He reports current alcohol use. He reports that he does not use drugs.    Family  "History  Family History   Problem Relation Name Age of Onset    Diabetes Father      Stroke Father      Heart attack Father          Allergies  Patient has no known allergies.    Review of systems  11-point ROS was performed and is negative except as noted in the HPI.     Physical Exam   CON: awake, alert, minimal distress;   EYES: conjunctiva wnl; PERRL; EOMI  ENMT: hearing intact; MMM;   NECK: symmetric; thyroid and cervical nodes wnl;   CV: S1 S2 - RRR with no m/g/r; no lower extremity edema; normal JVP; symmetric pulses  RESP: normal work of breathing; lungs CTAB;   GI: abdomen nontender; no organomegaly;   SKIN: no lesions or rashes; no induration;   MSK: ROM wnl; digits wnl;   NEURO: language and speech wnl; sensation and motor function grossly intact   PSYCH: oriented to situation; affect normal;     Last Recorded Vitals  Blood pressure 115/80, pulse 90, temperature 37.4 °C (99.3 °F), resp. rate 18, height 1.727 m (5' 8\"), weight 95.3 kg (210 lb), SpO2 92 %.    Relevant Results  Lab Results   Component Value Date    WBC 20.0 (H) 01/12/2024    HGB 14.4 01/12/2024    HCT 40.9 (L) 01/12/2024    MCV 91 01/12/2024     01/12/2024      Lab Results   Component Value Date    GLUCOSE 126 (H) 01/12/2024    CALCIUM 8.9 01/12/2024     01/12/2024    K 3.3 (L) 01/12/2024    CO2 26 01/12/2024    CL 99 01/12/2024    BUN 43 (H) 01/12/2024    CREATININE 1.79 (H) 01/12/2024        Scheduled medications:  [Held by provider] amLODIPine, 10 mg, oral, Daily  atorvastatin, 40 mg, oral, Nightly  azithromycin, 500 mg, intravenous, q24h  [Held by provider] carvedilol, 25 mg, oral, BID  cefTRIAXone, 2 g, intravenous, q24h  [Held by provider] chlorthalidone, 25 mg, oral, Daily  [Held by provider] doxazosin, 4 mg, oral, Daily  [Held by provider] lisinopril, 40 mg, oral, Daily  potassium chloride, 40 mEq, oral, Once      Continuous medications:  heparin, 0-4,500 Units/hr, Last Rate: 1,700 Units/hr (01/12/24 8224)      PRN " medications:  PRN medications: famotidine, heparin, melatonin, ondansetron, oxygen, oxygen, sennosides-docusate sodium          Assessment/Plan   Principal Problem:    Pneumonia of left upper lobe due to infectious organism    Fred Menendez is a 63 y.o. male with hypertension.  He presented to the ED with 1 week of dyspnea on exertion and productive cough.  He was hypoxic to 88% on room air.  Labs revealed a leukocytosis.  CT angiogram revealed a left-sided cavitary pneumonia and bilateral pulmonary emboli.     Acute hypoxic respiratory failure due to unprovoked first-time bilateral pulmonary emboli:  Left-sided cavitary pneumonia:   -No evidence of right heart strain on CT.  Hemodynamically stable.  Suspect the shortness of breath and hypoxia are driven by the PE.  Suspect the lung cavitation is a chronic incidental finding, although the recent onset of productive cough with leukocytosis suggests an acute superimposed infection.  High intensity heparin infusion  Vancomycin, ceftriaxone, azithromycin to cover MRSA and gram-negative pneumonia  Pulmonology consulted for further workup of the cavitary pneumonia and PE  Transition to DOAC if no invasive procedures necessary  Check respiratory culture, MRSA screen, procalcitonin   Supplemental oxygen, wean as tolerated    Hypertension:  Hold home blood pressure medications while at risk for hemodynamic decompensation, resume gradually as indicated    Tobacco use:  Nicotine patch     DVT prophylaxis: As above    Dispo: Inpatient, anticipate 2 to 5 days       Donnell Yañez MD

## 2024-01-13 LAB
ANION GAP SERPL CALC-SCNC: 16 MMOL/L (ref 10–20)
BUN SERPL-MCNC: 49 MG/DL (ref 6–23)
CALCIUM SERPL-MCNC: 8.7 MG/DL (ref 8.6–10.3)
CHLORIDE SERPL-SCNC: 99 MMOL/L (ref 98–107)
CO2 SERPL-SCNC: 25 MMOL/L (ref 21–32)
CREAT SERPL-MCNC: 1.72 MG/DL (ref 0.5–1.3)
EGFRCR SERPLBLD CKD-EPI 2021: 44 ML/MIN/1.73M*2
ERYTHROCYTE [DISTWIDTH] IN BLOOD BY AUTOMATED COUNT: 14.8 % (ref 11.5–14.5)
GLUCOSE SERPL-MCNC: 111 MG/DL (ref 74–99)
HCT VFR BLD AUTO: 40.6 % (ref 41–52)
HGB BLD-MCNC: 13.6 G/DL (ref 13.5–17.5)
MCH RBC QN AUTO: 31.3 PG (ref 26–34)
MCHC RBC AUTO-ENTMCNC: 33.5 G/DL (ref 32–36)
MCV RBC AUTO: 94 FL (ref 80–100)
MRSA DNA SPEC QL NAA+PROBE: NOT DETECTED
NRBC BLD-RTO: 0 /100 WBCS (ref 0–0)
PLATELET # BLD AUTO: 166 X10*3/UL (ref 150–450)
POTASSIUM SERPL-SCNC: 3.5 MMOL/L (ref 3.5–5.3)
RBC # BLD AUTO: 4.34 X10*6/UL (ref 4.5–5.9)
SODIUM SERPL-SCNC: 136 MMOL/L (ref 136–145)
UFH PPP CHRO-ACNC: 0.3 IU/ML
VANCOMYCIN SERPL-MCNC: 9.7 UG/ML (ref 5–20)
WBC # BLD AUTO: 15.1 X10*3/UL (ref 4.4–11.3)

## 2024-01-13 PROCEDURE — 1200000002 HC GENERAL ROOM WITH TELEMETRY DAILY

## 2024-01-13 PROCEDURE — 87640 STAPH A DNA AMP PROBE: CPT | Mod: 59 | Performed by: STUDENT IN AN ORGANIZED HEALTH CARE EDUCATION/TRAINING PROGRAM

## 2024-01-13 PROCEDURE — 99233 SBSQ HOSP IP/OBS HIGH 50: CPT | Performed by: STUDENT IN AN ORGANIZED HEALTH CARE EDUCATION/TRAINING PROGRAM

## 2024-01-13 PROCEDURE — 99223 1ST HOSP IP/OBS HIGH 75: CPT | Performed by: INTERNAL MEDICINE

## 2024-01-13 PROCEDURE — 2500000001 HC RX 250 WO HCPCS SELF ADMINISTERED DRUGS (ALT 637 FOR MEDICARE OP): Performed by: STUDENT IN AN ORGANIZED HEALTH CARE EDUCATION/TRAINING PROGRAM

## 2024-01-13 PROCEDURE — S4991 NICOTINE PATCH NONLEGEND: HCPCS | Performed by: STUDENT IN AN ORGANIZED HEALTH CARE EDUCATION/TRAINING PROGRAM

## 2024-01-13 PROCEDURE — 2500000004 HC RX 250 GENERAL PHARMACY W/ HCPCS (ALT 636 FOR OP/ED): Performed by: STUDENT IN AN ORGANIZED HEALTH CARE EDUCATION/TRAINING PROGRAM

## 2024-01-13 PROCEDURE — 84145 PROCALCITONIN (PCT): CPT | Mod: GEALAB | Performed by: STUDENT IN AN ORGANIZED HEALTH CARE EDUCATION/TRAINING PROGRAM

## 2024-01-13 PROCEDURE — 36415 COLL VENOUS BLD VENIPUNCTURE: CPT | Performed by: STUDENT IN AN ORGANIZED HEALTH CARE EDUCATION/TRAINING PROGRAM

## 2024-01-13 PROCEDURE — 85520 HEPARIN ASSAY: CPT | Performed by: PHYSICIAN ASSISTANT

## 2024-01-13 PROCEDURE — G0378 HOSPITAL OBSERVATION PER HR: HCPCS

## 2024-01-13 PROCEDURE — 85027 COMPLETE CBC AUTOMATED: CPT | Performed by: STUDENT IN AN ORGANIZED HEALTH CARE EDUCATION/TRAINING PROGRAM

## 2024-01-13 PROCEDURE — 96376 TX/PRO/DX INJ SAME DRUG ADON: CPT

## 2024-01-13 PROCEDURE — 80202 ASSAY OF VANCOMYCIN: CPT | Performed by: STUDENT IN AN ORGANIZED HEALTH CARE EDUCATION/TRAINING PROGRAM

## 2024-01-13 PROCEDURE — 36415 COLL VENOUS BLD VENIPUNCTURE: CPT | Performed by: PHYSICIAN ASSISTANT

## 2024-01-13 PROCEDURE — 2500000002 HC RX 250 W HCPCS SELF ADMINISTERED DRUGS (ALT 637 FOR MEDICARE OP, ALT 636 FOR OP/ED): Performed by: STUDENT IN AN ORGANIZED HEALTH CARE EDUCATION/TRAINING PROGRAM

## 2024-01-13 PROCEDURE — 96366 THER/PROPH/DIAG IV INF ADDON: CPT

## 2024-01-13 PROCEDURE — A4217 STERILE WATER/SALINE, 500 ML: HCPCS | Performed by: STUDENT IN AN ORGANIZED HEALTH CARE EDUCATION/TRAINING PROGRAM

## 2024-01-13 PROCEDURE — 2500000004 HC RX 250 GENERAL PHARMACY W/ HCPCS (ALT 636 FOR OP/ED): Performed by: PHYSICIAN ASSISTANT

## 2024-01-13 PROCEDURE — 80048 BASIC METABOLIC PNL TOTAL CA: CPT | Performed by: STUDENT IN AN ORGANIZED HEALTH CARE EDUCATION/TRAINING PROGRAM

## 2024-01-13 RX ORDER — WARFARIN SODIUM 5 MG/1
7.5 TABLET ORAL DAILY
Status: DISCONTINUED | OUTPATIENT
Start: 2024-01-13 | End: 2024-01-13

## 2024-01-13 RX ORDER — ENOXAPARIN SODIUM 100 MG/ML
1 INJECTION SUBCUTANEOUS EVERY 12 HOURS
Status: DISCONTINUED | OUTPATIENT
Start: 2024-01-13 | End: 2024-01-13

## 2024-01-13 RX ORDER — IBUPROFEN 200 MG
1 TABLET ORAL DAILY
Status: DISCONTINUED | OUTPATIENT
Start: 2024-01-13 | End: 2024-01-14 | Stop reason: HOSPADM

## 2024-01-13 RX ADMIN — VANCOMYCIN HYDROCHLORIDE 1250 MG: 10 INJECTION, POWDER, LYOPHILIZED, FOR SOLUTION INTRAVENOUS at 18:13

## 2024-01-13 RX ADMIN — CEFTRIAXONE SODIUM 2 G: 2 INJECTION, SOLUTION INTRAVENOUS at 12:29

## 2024-01-13 RX ADMIN — APIXABAN 10 MG: 5 TABLET, FILM COATED ORAL at 20:46

## 2024-01-13 RX ADMIN — ATORVASTATIN CALCIUM 40 MG: 40 TABLET, FILM COATED ORAL at 20:46

## 2024-01-13 RX ADMIN — AZITHROMYCIN MONOHYDRATE 500 MG: 500 INJECTION, POWDER, LYOPHILIZED, FOR SOLUTION INTRAVENOUS at 13:52

## 2024-01-13 RX ADMIN — HEPARIN SODIUM 1700 UNITS/HR: 10000 INJECTION, SOLUTION INTRAVENOUS at 16:27

## 2024-01-13 RX ADMIN — NICOTINE 1 PATCH: 14 PATCH, EXTENDED RELEASE TRANSDERMAL at 12:40

## 2024-01-13 ASSESSMENT — COGNITIVE AND FUNCTIONAL STATUS - GENERAL
MOBILITY SCORE: 24
DAILY ACTIVITIY SCORE: 24
MOBILITY SCORE: 24
DAILY ACTIVITIY SCORE: 24

## 2024-01-13 ASSESSMENT — PAIN SCALES - GENERAL
PAINLEVEL_OUTOF10: 0 - NO PAIN
PAINLEVEL_OUTOF10: 0 - NO PAIN

## 2024-01-13 ASSESSMENT — PAIN - FUNCTIONAL ASSESSMENT: PAIN_FUNCTIONAL_ASSESSMENT: 0-10

## 2024-01-13 NOTE — CONSULTS
Inpatient consult to Pulmonology  Consult performed by: Ana Paula Rincon MD  Consult ordered by: Donnell Yañez MD        Department of Medicine  Division of Pulmonary, Critical Care, and Sleep Medicine      History Of Present Illness  Fred Menendez is a 63 y.o. male with history of hypertension admitted with acute hypoxic respiratory failure due to PE, pneumonia.  Patient presented to the ED with worsening dyspnea for about a week, cough with occasional yellowish phlegm.  Denies any fever or chills, no hemoptysis, reported some musculoskeletal chest pain from coughing.  All other systems reviewed and negative otherwise.    Active smoker about 15 cigarettes/day.       Past Medical History:   Diagnosis Date    Male infertility, unspecified     Sterility male    Personal history of diseases of the blood and blood-forming organs and certain disorders involving the immune mechanism 07/09/2015    History of thrombocytopenia    Personal history of other diseases of the nervous system and sense organs 02/28/2020    History of retinal detachment    Personal history of other infectious and parasitic diseases     History of varicella    Personal history of other infectious and parasitic diseases     History of mumps    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       Past Surgical History:   Procedure Laterality Date    OTHER SURGICAL HISTORY  07/09/2015    Excisional Lymph Node Biopsy    SPLENECTOMY, TOTAL  07/09/2015    Splenectomy        Social History     Tobacco Use    Smoking status: Every Day     Packs/day: 0.50     Years: 30.00     Additional pack years: 0.00     Total pack years: 15.00     Types: Cigarettes     Passive exposure: Current    Smokeless tobacco: Never   Vaping Use    Vaping Use: Never used   Substance Use Topics    Alcohol use: Yes    Drug use: Never       Family History   Problem Relation Name Age of Onset    Diabetes Father      Stroke Father      Heart attack Father            "  Allergies  Patient has no known allergies.    Review of Systems       Physical exam  Constitutional: Normal appearance.  HEENT: Normocephalic and atraumatic.  Cardiovascular: Normal rate and regular rhythm.  Pulmonary: Normal respiratory effort, bilateral clear breath sounds, no wheezing or rhonchi.  Musculoskeletal: No edema, no cyanosis.  Neurological: Awake, alert and oriented x3.  Psychiatric: Normal behavior, mood and affect.     Vital Signs  Visit Vitals  /68 (Patient Position: Lying)   Pulse 75   Temp 36.5 °C (97.7 °F) (Temporal)   Resp 18   Ht 1.727 m (5' 8\")   Wt 88.4 kg (194 lb 14.2 oz)   SpO2 90%   BMI 29.63 kg/m²   Smoking Status Every Day   BSA 2.06 m²      Lab Results   Component Value Date    WBC 15.1 (H) 01/13/2024    HGB 13.6 01/13/2024    HCT 40.6 (L) 01/13/2024    MCV 94 01/13/2024     01/13/2024      Lab Results   Component Value Date    GLUCOSE 111 (H) 01/13/2024    CALCIUM 8.7 01/13/2024     01/13/2024    K 3.5 01/13/2024    CO2 25 01/13/2024    CL 99 01/13/2024    BUN 49 (H) 01/13/2024    CREATININE 1.72 (H) 01/13/2024      Lab Results   Component Value Date    ALT 42 01/12/2024    AST 45 (H) 01/12/2024    ALKPHOS 79 01/12/2024    BILITOT 1.4 (H) 01/12/2024        Oxygen Therapy  SpO2: 90 %  Medical Gas Therapy: Supplemental oxygen  O2 Delivery Method: Nasal cannula       Medications   Scheduled medications  [Held by provider] amLODIPine, 10 mg, oral, Daily  atorvastatin, 40 mg, oral, Nightly  azithromycin, 500 mg, intravenous, q24h  [Held by provider] carvedilol, 25 mg, oral, BID  cefTRIAXone, 2 g, intravenous, q24h  [Held by provider] chlorthalidone, 25 mg, oral, Daily  [Held by provider] doxazosin, 4 mg, oral, Daily  [Held by provider] lisinopril, 40 mg, oral, Daily  nicotine, 1 patch, transdermal, Daily  vancomycin, 1,250 mg, intravenous, q24h      Continuous medications  heparin, 0-4,500 Units/hr, Last Rate: 1,700 Units/hr (01/12/24 1936)      PRN medications  PRN " medications: famotidine, heparin, melatonin, ondansetron, oxygen, oxygen, sennosides-docusate sodium     Chest Radiograph   CT angio chest for pulmonary embolism 01/12/2024    Narrative  STUDY:  CT Angiogram of the Chest; 1/12/2024 11:57 AM  INDICATION:  Mass in left upper lung, hypoxia.  COMPARISON:  XR chest 1/12/2024.  ACCESSION NUMBER(S):  [id number]  ORDERING CLINICIAN:  JOSUE ANDERSON  TECHNIQUE:  CTA of the chest was performed with intravenous contrast.  Images are reviewed and processed at a workstation according to the CT  angiogram protocol with 3-D and/or MIP post processing imaging  generated.  Omnipaque 350--77 mL was administered intravenously.  Automated mA/kV exposure control was utilized and patient examination  was performed in strict accordance with principles of ALARA.  FINDINGS:  Large filling defects noted within the main pulmonary artery  bilaterally extending into first the secondary branches of the upper  lobes and right lower lobe consistent with acute pulmonary embolism.  The thoracic aorta is normal in course and caliber without dissection  or aneurysm.  The heart is normal in size with minimal pericardial effusion.  No  evidence of right heart strain.  Thoracic lymph nodes are not  enlarged.  Minimal left pleural effusion.  The airways are patent.  3 cm left upper lobe thin-walled cavity noted containing small amount  of fluid associated with adjacent airspace opacification of the lung.  Mild centrilobular pulmonary emphysema.  Upper abdomen demonstrates no acute pathology.  3.5 cm cyst noted  within hepatic segment 4B as well as an additional 2 cm cyst within  hepatic segment 5.  Mild thickening of both adrenal gland suggestive  of hypertrophic change.  There are no acute fractures.  No suspicious bony lesions.    Impression  Extensive acute bilateral pulmonary embolism without evidence of right  heart strain. 3 cm left upper lobe thin-walled cavity with adjacent  airspace  "opacification of the lung consistent pneumonia. Pulmonary  emphysema. Minimal left pleural effusion. No evidence of right heart  strain.  Critical findings were discussed with Dr. Kali Cahng at 12:30 PM  January 12, 2024.  Signed by Min Weaver MD      XR chest 2 views 01/12/2024    Narrative  STUDY:  Chest Radiographs;  1/12/2024 10:26 AM.  INDICATION:  Shortness of breath and hypoxia.  COMPARISON:  None Available.  ACCESSION NUMBER(S):  [id number]  ORDERING CLINICIAN:  JOSUE ANDERSON  TECHNIQUE:  Frontal and lateral chest.  FINDINGS:  CARDIOMEDIASTINAL SILHOUETTE:  Cardiomediastinal silhouette is normal in size and configuration.    LUNGS:  Chronic interstitial lung changes are identified.  Air-fluid level is  seen in the left upper lobe measuring 3.4 x 2.9 cm.  There is  surrounding focal airspace disease in the left upper lobe.  Findings  are highly concerning for acute pneumonia with possible abscess  formation or necrosis.  Vascular calcifications are seen.  There is  also left lower lobe atelectasis.    ABDOMEN:  No remarkable upper abdominal findings.    BONES:  No acute osseous changes.    Impression  Left upper lobe consolidation is seen with a central air-fluid level  that could represent abscess formation.  Dedicated CT of the chest is  recommended.  Signed by Shelia Oakes MD         Pulmonary Function Tests   Pulmonary Functions Testing Results:  No results found for: \"FEV1\", \"FVC\", \"UKG0XXH\", \"TLC\", \"DLCO\"         Assessment and Plan / Recommendations   Assessment/Plan   63 y.o. male with history of hypertension admitted with acute hypoxic respiratory failure due to PE, pneumonia  1.  Acute hypoxic respiratory failure due to PE and pneumonia  2.  Extensive bilateral unprovoked PE  3.  Left upper lobe consolidation with thin-walled cavity, likely bulla with superimposed pneumonia versus necrotizing pneumonia, possible but less likely malignancy    Feels much better, on room air at rest today.  " Hemodynamically stable, no RV strain on CT    -Continue vancomycin, ceftriaxone and azithromycin pending MRSA screen  -Continue heparin drip, plan to discharge on Eliquis.  Will need thrombophilia workup -can be done outpatient  -echocardiogram  -Encourage incentive spirometry, wean O2 as tolerated  -Recommend to repeat CT chest in 4 weeks to assure resolution of consolidation otherwise will need bronch/biopsy     Ana Paula Rincon MD

## 2024-01-13 NOTE — PROGRESS NOTES
"    [facility]  Department of Hospital Medicine    PROGRESS NOTE    Fred Menendez is a 63 y.o. male on day 1 of admission presenting with PE (pulmonary thromboembolism) (CMS/HCC).    Subjective   Feels much better today.  Has only walked from his bed to the bathroom, but did not have any dyspnea on exertion doing that.  Weaned off of oxygen.       Objective     Physical Exam:  Con: awake, alert; no distress;   Eyes: conjunctiva wnl; EOMI;   ENMT: hearing intact; MMM;   MSK: ROM wnl; digits wnl;   Resp: normal work of breathing; no cyanosis;   Neuro: moving all extremities; no abnormal movements  Psych: oriented to situation; affect wnl;     Last Recorded Vitals:  /68 (Patient Position: Lying)   Pulse 75   Temp 36.5 °C (97.7 °F) (Temporal)   Resp 18   Ht 1.727 m (5' 8\")   Wt 88.4 kg (194 lb 14.2 oz)   SpO2 90%   BMI 29.63 kg/m²      Scheduled medications:  [Held by provider] amLODIPine, 10 mg, oral, Daily  atorvastatin, 40 mg, oral, Nightly  azithromycin, 500 mg, intravenous, q24h  [Held by provider] carvedilol, 25 mg, oral, BID  cefTRIAXone, 2 g, intravenous, q24h  [Held by provider] chlorthalidone, 25 mg, oral, Daily  [Held by provider] doxazosin, 4 mg, oral, Daily  enoxaparin, 1 mg/kg, subcutaneous, q12h  [Held by provider] lisinopril, 40 mg, oral, Daily  nicotine, 1 patch, transdermal, Daily  vancomycin, 1,250 mg, intravenous, q24h  warfarin, 7.5 mg, oral, Daily      Continuous medications:  heparin, 0-4,500 Units/hr, Last Rate: 1,700 Units/hr (01/12/24 2301)      PRN medications:  PRN medications: famotidine, heparin, melatonin, ondansetron, oxygen, oxygen, sennosides-docusate sodium     Relevant Results:  Lab Results   Component Value Date    WBC 15.1 (H) 01/13/2024    HGB 13.6 01/13/2024    HCT 40.6 (L) 01/13/2024    MCV 94 01/13/2024     01/13/2024      Lab Results   Component Value Date    GLUCOSE 111 (H) 01/13/2024    CALCIUM 8.7 01/13/2024    NA " 136 01/13/2024    K 3.5 01/13/2024    CO2 25 01/13/2024    CL 99 01/13/2024    BUN 49 (H) 01/13/2024    CREATININE 1.72 (H) 01/13/2024                Assessment/Plan   Principal Problem:    PE (pulmonary thromboembolism) (CMS/HCC)  Active Problems:    Hypertension    Pneumonia of left upper lobe due to infectious organism    Acute hypoxic respiratory failure (CMS/HCC)    Fred Menendez is a 63 y.o. male with hypertension.  He presented to the ED with 1 week of dyspnea on exertion and productive cough.  He was hypoxic to 88% on room air.  Labs revealed a leukocytosis.  CT angiogram revealed a left-sided cavitary pneumonia and bilateral pulmonary emboli.      Unprovoked first-time bilateral pulmonary emboli:  Left-sided cavitary pneumonia:   Acute hypoxic respiratory failure due to above: resolved   -No evidence of right heart strain on CT.  Hemodynamically stable.  Suspect the shortness of breath and hypoxia are driven by the PE.  Suspect the lung cavitation is a chronic incidental finding, although the recent onset of productive cough with leukocytosis suggests an acute superimposed infection.  Vancomycin, ceftriaxone, azithromycin to cover MRSA and gram-negative pneumonia  Pulmonology consulted for further workup of the cavitary pneumonia and PE  DOACs not covered by insurance, starting warfarin and will bridge with enoxaparin; transition off of heparin infusion today  Check respiratory culture, MRSA screen, procalcitonin   Supplemental oxygen, wean as tolerated     Hypertension:  Hold home blood pressure medications while at risk for hemodynamic decompensation, resume gradually as indicated     Tobacco use:  Nicotine patch      DVT prophylaxis: As above     Dispo: Inpatient, anticipate 2 to 5 days         Donnell Yañez MD

## 2024-01-14 VITALS
HEIGHT: 68 IN | OXYGEN SATURATION: 87 % | DIASTOLIC BLOOD PRESSURE: 78 MMHG | SYSTOLIC BLOOD PRESSURE: 113 MMHG | RESPIRATION RATE: 18 BRPM | BODY MASS INDEX: 29.54 KG/M2 | TEMPERATURE: 97.5 F | HEART RATE: 85 BPM | WEIGHT: 194.89 LBS

## 2024-01-14 LAB
ANION GAP SERPL CALC-SCNC: 20 MMOL/L (ref 10–20)
BUN SERPL-MCNC: 46 MG/DL (ref 6–23)
CALCIUM SERPL-MCNC: 9.1 MG/DL (ref 8.6–10.3)
CHLORIDE SERPL-SCNC: 100 MMOL/L (ref 98–107)
CO2 SERPL-SCNC: 19 MMOL/L (ref 21–32)
CREAT SERPL-MCNC: 1.63 MG/DL (ref 0.5–1.3)
EGFRCR SERPLBLD CKD-EPI 2021: 47 ML/MIN/1.73M*2
ERYTHROCYTE [DISTWIDTH] IN BLOOD BY AUTOMATED COUNT: 15.1 % (ref 11.5–14.5)
GLUCOSE SERPL-MCNC: 109 MG/DL (ref 74–99)
HCT VFR BLD AUTO: 42.9 % (ref 41–52)
HGB BLD-MCNC: 13.9 G/DL (ref 13.5–17.5)
MCH RBC QN AUTO: 31.6 PG (ref 26–34)
MCHC RBC AUTO-ENTMCNC: 32.4 G/DL (ref 32–36)
MCV RBC AUTO: 98 FL (ref 80–100)
NRBC BLD-RTO: 0 /100 WBCS (ref 0–0)
PLATELET # BLD AUTO: 189 X10*3/UL (ref 150–450)
POTASSIUM SERPL-SCNC: 3.4 MMOL/L (ref 3.5–5.3)
PROCALCITONIN SERPL-MCNC: 0.84 NG/ML
RBC # BLD AUTO: 4.4 X10*6/UL (ref 4.5–5.9)
SODIUM SERPL-SCNC: 136 MMOL/L (ref 136–145)
WBC # BLD AUTO: 11.2 X10*3/UL (ref 4.4–11.3)

## 2024-01-14 PROCEDURE — 99238 HOSP IP/OBS DSCHRG MGMT 30/<: CPT | Performed by: STUDENT IN AN ORGANIZED HEALTH CARE EDUCATION/TRAINING PROGRAM

## 2024-01-14 PROCEDURE — 2500000002 HC RX 250 W HCPCS SELF ADMINISTERED DRUGS (ALT 637 FOR MEDICARE OP, ALT 636 FOR OP/ED): Performed by: STUDENT IN AN ORGANIZED HEALTH CARE EDUCATION/TRAINING PROGRAM

## 2024-01-14 PROCEDURE — 99233 SBSQ HOSP IP/OBS HIGH 50: CPT | Performed by: INTERNAL MEDICINE

## 2024-01-14 PROCEDURE — 2500000001 HC RX 250 WO HCPCS SELF ADMINISTERED DRUGS (ALT 637 FOR MEDICARE OP): Performed by: STUDENT IN AN ORGANIZED HEALTH CARE EDUCATION/TRAINING PROGRAM

## 2024-01-14 PROCEDURE — 85027 COMPLETE CBC AUTOMATED: CPT | Performed by: STUDENT IN AN ORGANIZED HEALTH CARE EDUCATION/TRAINING PROGRAM

## 2024-01-14 PROCEDURE — 36415 COLL VENOUS BLD VENIPUNCTURE: CPT | Performed by: STUDENT IN AN ORGANIZED HEALTH CARE EDUCATION/TRAINING PROGRAM

## 2024-01-14 PROCEDURE — S4991 NICOTINE PATCH NONLEGEND: HCPCS | Performed by: STUDENT IN AN ORGANIZED HEALTH CARE EDUCATION/TRAINING PROGRAM

## 2024-01-14 PROCEDURE — 2500000004 HC RX 250 GENERAL PHARMACY W/ HCPCS (ALT 636 FOR OP/ED): Performed by: PHYSICIAN ASSISTANT

## 2024-01-14 PROCEDURE — 80048 BASIC METABOLIC PNL TOTAL CA: CPT | Performed by: STUDENT IN AN ORGANIZED HEALTH CARE EDUCATION/TRAINING PROGRAM

## 2024-01-14 PROCEDURE — 96366 THER/PROPH/DIAG IV INF ADDON: CPT

## 2024-01-14 PROCEDURE — G0378 HOSPITAL OBSERVATION PER HR: HCPCS

## 2024-01-14 RX ORDER — IBUPROFEN 200 MG
1 TABLET ORAL EVERY 24 HOURS
Qty: 28 PATCH | Refills: 0 | Status: SHIPPED | OUTPATIENT
Start: 2024-01-14 | End: 2024-03-27 | Stop reason: SDUPTHER

## 2024-01-14 RX ORDER — AMOXICILLIN AND CLAVULANATE POTASSIUM 875; 125 MG/1; MG/1
1 TABLET, FILM COATED ORAL 2 TIMES DAILY
Qty: 20 TABLET | Refills: 0 | Status: SHIPPED | OUTPATIENT
Start: 2024-01-14 | End: 2024-01-24

## 2024-01-14 RX ADMIN — APIXABAN 10 MG: 5 TABLET, FILM COATED ORAL at 09:44

## 2024-01-14 RX ADMIN — NICOTINE 1 PATCH: 21 PATCH, EXTENDED RELEASE TRANSDERMAL at 09:45

## 2024-01-14 RX ADMIN — CEFTRIAXONE SODIUM 2 G: 2 INJECTION, SOLUTION INTRAVENOUS at 11:57

## 2024-01-14 RX ADMIN — AZITHROMYCIN MONOHYDRATE 500 MG: 500 INJECTION, POWDER, LYOPHILIZED, FOR SOLUTION INTRAVENOUS at 13:37

## 2024-01-14 ASSESSMENT — PAIN SCALES - GENERAL
PAINLEVEL_OUTOF10: 0 - NO PAIN
PAINLEVEL_OUTOF10: 0 - NO PAIN

## 2024-01-14 ASSESSMENT — COGNITIVE AND FUNCTIONAL STATUS - GENERAL
MOBILITY SCORE: 24
DAILY ACTIVITIY SCORE: 24

## 2024-01-14 ASSESSMENT — PAIN - FUNCTIONAL ASSESSMENT
PAIN_FUNCTIONAL_ASSESSMENT: 0-10
PAIN_FUNCTIONAL_ASSESSMENT: 0-10

## 2024-01-14 NOTE — CONSULTS
Vancomycin Dosing by Pharmacy- Cessation of Therapy    Consult to pharmacy for vancomycin dosing has been discontinued by the prescriber, pharmacy will sign off at this time.    Please call pharmacy if there are further questions or re-enter a consult if vancomycin is resumed.     Cortney Farooq, PharmD

## 2024-01-14 NOTE — DISCHARGE SUMMARY
Discharge Diagnosis  PE (pulmonary thromboembolism) (CMS/[facility])    Issues Requiring Follow-Up  Eliquis prior Auth versus change to warfarin within 30 days  Hypoxia  Cavitary pneumonia: Repeat CT in 4 weeks then pulmonology follow-up  Hematology evaluation for unprovoked PE  Smoking cessation    Discharge Meds     Your medication list        START taking these medications        Instructions Last Dose Given Next Dose Due   amoxicillin-pot clavulanate 875-125 mg tablet  Commonly known as: Augmentin      Take 1 tablet (875 mg) by mouth 2 times a day for 10 days.       apixaban 5 mg (74 tabs) tablet  Commonly known as: Eliquis  Notes to patient: Last taken at 9:44 AM 1/14/24      Take 2 tablets (10 mg) by mouth 2 times a day for 5 days, then take 1 tablet (5 mg) by mouth 2 times a day.       nicotine 14 mg/24 hr patch  Commonly known as: Nicoderm CQ      Place 1 patch over 24 hours on the skin once every 24 hours for 28 days.              CHANGE how you take these medications        Instructions Last Dose Given Next Dose Due   amLODIPine 10 mg tablet  Commonly known as: Norvasc  What changed: when to take this      Take 1 tablet (10 mg) by mouth once daily.       chlorthalidone 25 mg tablet  Commonly known as: [place]  What changed: when to take this      Take 1 tablet (25 mg) by mouth once daily.       doxazosin 4 mg tablet  Commonly known as: Cardura  What changed: when to take this      Take 1 tablet (4 mg) by mouth once daily.              CONTINUE taking these medications        Instructions Last Dose Given Next Dose Due   atorvastatin 40 mg tablet  Commonly known as: Lipitor      Take 1 tablet (40 mg) by mouth once daily at bedtime.       carvedilol 25 mg tablet  Commonly known as: Coreg      Take 1 tablet (25 mg) by mouth 2 times a day.       lisinopril 20 mg tablet      Take 2 tablets (40 mg) by mouth once daily.                 Where to Get Your Medications        These medications were sent to [facility]  [id number] - [place], [place] - [address]  [address] [address]      Phone: [phone]   amoxicillin-pot clavulanate 875-125 mg tablet  apixaban 5 mg (74 tabs) tablet  nicotine 14 mg/24 hr patch         Test Results Pending At Discharge  Pending Labs       Order Current Status    Blood Culture Preliminary result    Blood Culture Preliminary result            Hospital Course   Fred Menendez is a 63 y.o. male with hypertension.  He presented to the ED with 1 week of dyspnea on exertion and productive cough.  He was hypoxic to 88% on room air.  Labs revealed a leukocytosis.  CT angiogram revealed a left-sided cavitary pneumonia and bilateral pulmonary emboli without CT evidence of right heart strain.  He was started on heparin and antibiotics.  Pulmonology was consulted.  He was transitioned to Eliquis.  MRSA screen was negative.  His oxygen was weaned to room air at rest, but he did desaturate with exertion so was prescribed home oxygen.  Pulmonology recommended 10 more days of oral Augmentin and repeat CT imaging in 4 weeks to monitor cavitary pneumonia.  Will need to follow-up with pulmonology and hematology since the PE was unprovoked.  Prior authorization for the Eliquis is pending; he was provided with a coupon for 1 month free, and will need to follow-up with his PCP within the month to determine whether he will continue on Eliquis or needs to be transitioned to warfarin.  Since his blood pressure was low to normal on admission, his blood pressure medications were held and his pressures remained normal.  He was advised to monitor his blood pressure at home and resume his blood pressure medications gradually as needed.      Outpatient Follow-Up  No future appointments.      Donnell Yañez MD

## 2024-01-14 NOTE — CARE PLAN
Spoke with patient at bedside regarding price of Eliquis and need for prior authorization. Plan is for patient to receive 30 day free trail then pay for the medication until his deductible is met but he is aware PCP will need to complete prior authorization before second month of Eliquis. Patient is agreeable to plan and will likely DC today. Physician is aware patient is agreeable

## 2024-01-14 NOTE — DISCHARGE INSTRUCTIONS
"For the blood clot:  -Continue taking the blood thinner apixaban (Eliquis). If cost becomes an issue, you can be switched to warfarin (Coumadin) with a temporary \"bridge\" of injectable enoxaparin (Lovenox).  Follow-up with your primary care provider within 30 days to ensure you have a plan in place for next month.   -For the next 5 days, the starting dose of Eliquis is higher (2 tablets twice a day), and then it is reduced to 1 tablet twice a day.  If you start to have bleeding on the higher dose (for example, nosebleeding), you can transition early to 1 tablet twice a day.  -For a first-time blood clot, it is recommended to be on the blood thinner for at least 3 months.  Because we do not have a clear cause for it, we sent a referral for you to see hematology for further testing and to decide whether you should be on a blood thinner for longer than 3 months.    For your blood pressure:  -Your blood pressure has been running low, potentially because of the blood clot.  For this reason, we have had you off of your home blood pressure medications and recommend staying off in the short-term.  Check your blood pressure at home once a day. If it is consistently greater than 130/90, you can start adding back your blood pressure medications 1 at a time.  -The blood pressure medications we have on your medication list are amlodipine, lisinopril, carvedilol, chlorthalidone, and doxazosin.  Hold all of these for now, and when your blood pressure starts to go up you can resume lisinopril first, followed by carvedilol, then amlodipine, then chlorthalidone, then doxazosin.  If the doxazosin was prescribed for a different reason, such as urinary issues, it would be okay to resume it sooner.     For the pneumonia:  -The antibiotic amoxicillin (Augmentin) has been prescribed to take twice a day for 10 days.  -Dr. Rincon's office should reach out to you about scheduling a CT scan in about 4 weeks to take another look at your lungs, " and then a follow-up appointment with Dr. Rincon to discuss the results.

## 2024-01-14 NOTE — CARE PLAN
Problem: Safety  Goal: Patient will be injury free during hospitalization  Outcome: Progressing     Problem: Psychosocial Needs  Goal: Collaborate with me, my family, and caregiver to identify my specific goals  Outcome: Progressing     Problem: Discharge Barriers  Goal: My discharge needs are met  Outcome: Progressing     Problem: Pain  Goal: My pain/discomfort is manageable  Outcome: Met     Problem: Safety  Goal: I will remain free of falls  Outcome: Met     Problem: Daily Care  Goal: Daily care needs are met  Outcome: Met     Problem: Psychosocial Needs  Goal: Demonstrates ability to cope with hospitalization/illness  Outcome: Met     Problem: Respiratory  Goal: Clear secretions with interventions this shift  Outcome: Met  Goal: Minimize anxiety/maximize coping throughout shift  Outcome: Met  Goal: Minimal/no exertional discomfort or dyspnea this shift  Outcome: Met  Goal: No signs of respiratory distress (eg. Use of accessory muscles. Peds grunting)  Outcome: Met  Goal: Patent airway maintained this shift  Outcome: Met  Goal: Wean oxygen to maintain O2 saturation per order/standard this shift  Outcome: Met

## 2024-01-14 NOTE — CARE PLAN
Problem: Safety  Goal: Patient will be injury free during hospitalization  Outcome: Progressing     Problem: Psychosocial Needs  Goal: Collaborate with me, my family, and caregiver to identify my specific goals  Outcome: Progressing     Problem: Discharge Barriers  Goal: My discharge needs are met  Outcome: Progressing     Problem: Respiratory  Goal: Tolerate mechanical ventilation evidenced by VS/agitation level this shift  Outcome: Progressing     Problem: Respiratory  Goal: Tolerate pulmonary toileting this shift  Outcome: Progressing

## 2024-01-14 NOTE — PROGRESS NOTES
"Department of Medicine  Division of Pulmonary, Critical Care, and Sleep Medicine    Fred Menendez is a 63 y.o. male on day 2 of admission presenting with PE (pulmonary thromboembolism) (CMS/HCC).    Subjective   Improving, no events overnight, on ra at rest today        Objective     Vital Signs      1/12/2024     5:51 PM 1/12/2024     8:25 PM 1/13/2024     4:50 AM 1/13/2024     5:30 AM 1/13/2024     2:39 PM 1/13/2024     8:54 PM 1/14/2024     5:01 AM   Vitals   Systolic 109 113 103  97 121 113   Diastolic 76 76 68  70 81 78   Heart Rate 82 81 75  75 82 85   Temp 36.7 °C (98.1 °F) 36.1 °C (97 °F) 36.5 °C (97.7 °F)  36.7 °C (98.1 °F) 36 °C (96.8 °F) 36.4 °C (97.5 °F)   Resp 18 16 18  17 16 18   Height (in) 1.727 m (5' 8\")         Weight (lb) 210   194.89      BMI 31.93 kg/m2   29.63 kg/m2      BSA (m2) 2.14 m2   2.06 m2           Physical exam  Constitutional: Normal appearance.  HEENT: Normocephalic and atraumatic.  Cardiovascular: Normal rate and regular rhythm.  Pulmonary: Normal respiratory effort, bilateral clear breath sounds, no wheezing or rhonchi.  Musculoskeletal: No edema, no cyanosis.  Neurological: Awake, alert and oriented x3.  Psychiatric: Normal behavior, mood and affect.    Labs:  Lab Results   Component Value Date    WBC 11.2 01/14/2024    HGB 13.9 01/14/2024    HCT 42.9 01/14/2024    MCV 98 01/14/2024     01/14/2024      Lab Results   Component Value Date    GLUCOSE 109 (H) 01/14/2024    CALCIUM 9.1 01/14/2024     01/14/2024    K 3.4 (L) 01/14/2024    CO2 19 (L) 01/14/2024     01/14/2024    BUN 46 (H) 01/14/2024    CREATININE 1.63 (H) 01/14/2024      Lab Results   Component Value Date    ALT 42 01/12/2024    AST 45 (H) 01/12/2024    ALKPHOS 79 01/12/2024    BILITOT 1.4 (H) 01/12/2024        Oxygen Therapy  SpO2: 91 %  Medical Gas Therapy: Supplemental oxygen  O2 Delivery Method: Nasal cannula       Intake/Output last 3 Shifts:  I/O last 3 completed shifts:  In: 780 (8.8 mL/kg) " [P.O.:480; IV Piggyback:300]  Out: - (0 mL/kg)   Weight: 88.4 kg       Medications   Scheduled medications  [Held by provider] amLODIPine, 10 mg, oral, Daily  apixaban, 10 mg, oral, BID   Followed by  [START ON 1/20/2024] apixaban, 5 mg, oral, BID  atorvastatin, 40 mg, oral, Nightly  azithromycin, 500 mg, intravenous, q24h  [Held by provider] carvedilol, 25 mg, oral, BID  cefTRIAXone, 2 g, intravenous, q24h  [Held by provider] chlorthalidone, 25 mg, oral, Daily  [Held by provider] doxazosin, 4 mg, oral, Daily  [Held by provider] lisinopril, 40 mg, oral, Daily  nicotine, 1 patch, transdermal, Daily      Continuous medications     PRN medications  PRN medications: famotidine, melatonin, ondansetron, oxygen, oxygen, sennosides-docusate sodium     Allergies  Patient has no known allergies.    Chest Radiograph   CT angio chest for pulmonary embolism 01/12/2024    Narrative  STUDY:  CT Angiogram of the Chest; 1/12/2024 11:57 AM  INDICATION:  Mass in left upper lung, hypoxia.  COMPARISON:  XR chest 1/12/2024.  ACCESSION NUMBER(S):  [id number]  ORDERING CLINICIAN:  JOSUE ANDERSON  TECHNIQUE:  CTA of the chest was performed with intravenous contrast.  Images are reviewed and processed at a workstation according to the CT  angiogram protocol with 3-D and/or MIP post processing imaging  generated.  Omnipaque 350--77 mL was administered intravenously.  Automated mA/kV exposure control was utilized and patient examination  was performed in strict accordance with principles of ALARA.  FINDINGS:  Large filling defects noted within the main pulmonary artery  bilaterally extending into first the secondary branches of the upper  lobes and right lower lobe consistent with acute pulmonary embolism.  The thoracic aorta is normal in course and caliber without dissection  or aneurysm.  The heart is normal in size with minimal pericardial effusion.  No  evidence of right heart strain.  Thoracic lymph nodes are not  enlarged.  Minimal  left pleural effusion.  The airways are patent.  3 cm left upper lobe thin-walled cavity noted containing small amount  of fluid associated with adjacent airspace opacification of the lung.  Mild centrilobular pulmonary emphysema.  Upper abdomen demonstrates no acute pathology.  3.5 cm cyst noted  within hepatic segment 4B as well as an additional 2 cm cyst within  hepatic segment 5.  Mild thickening of both adrenal gland suggestive  of hypertrophic change.  There are no acute fractures.  No suspicious bony lesions.    Impression  Extensive acute bilateral pulmonary embolism without evidence of right  heart strain. 3 cm left upper lobe thin-walled cavity with adjacent  airspace opacification of the lung consistent pneumonia. Pulmonary  emphysema. Minimal left pleural effusion. No evidence of right heart  strain.  Critical findings were discussed with Dr. Kali Chang at 12:30 PM  January 12, 2024.  Signed by Min Weaver MD       XR chest 2 views 01/12/2024    Narrative  STUDY:  Chest Radiographs;  1/12/2024 10:26 AM.  INDICATION:  Shortness of breath and hypoxia.  COMPARISON:  None Available.  ACCESSION NUMBER(S):  [id number]  ORDERING CLINICIAN:  JOSUE ANDERSON  TECHNIQUE:  Frontal and lateral chest.  FINDINGS:  CARDIOMEDIASTINAL SILHOUETTE:  Cardiomediastinal silhouette is normal in size and configuration.    LUNGS:  Chronic interstitial lung changes are identified.  Air-fluid level is  seen in the left upper lobe measuring 3.4 x 2.9 cm.  There is  surrounding focal airspace disease in the left upper lobe.  Findings  are highly concerning for acute pneumonia with possible abscess  formation or necrosis.  Vascular calcifications are seen.  There is  also left lower lobe atelectasis.    ABDOMEN:  No remarkable upper abdominal findings.    BONES:  No acute osseous changes.    Impression  Left upper lobe consolidation is seen with a central air-fluid level  that could represent abscess formation.  Dedicated  CT of the chest is  recommended.  Signed by Shelia Oakes MD         Assessment and Plan / Recommendations   Assessment/Plan   63 y.o. male with history of hypertension admitted with acute hypoxic respiratory failure due to PE, pneumonia  1.  Acute hypoxic respiratory failure due to PE and pneumonia  2.  Extensive bilateral unprovoked PE  3.  Left upper lobe consolidation with thin-walled cavity, likely bulla with superimposed pneumonia versus necrotizing pneumonia, possible but less likely malignancy     Improving, on RA at rest.  Hemodynamically stable, no RV strain on CT     -dc vancomycin - MRSA screen -ve, ceftriaxone and azithromycin, plan to discharge on Augmentin for 10-14 days  -Continue heparin drip, plan to discharge on Eliquis if approved by inssurance.  Will need thrombophilia workup -can be done outpatient  -echocardiogram pending, if showed signs of elevated PA pressure recommend to repeat in 3 months   -Encourage incentive spirometry, wean O2 as tolerated, RT to eval for home O2 on discharge   -my office will arrange for CT in 4 weeks and follow up with me after to assure resolution of consolidation otherwise will need bronch/biopsy    We will sign off, please contact with any questions.         Ana Paula Rincon MD

## 2024-01-15 DIAGNOSIS — J18.9 PNEUMONIA WITH CAVITY OF LUNG: ICD-10-CM

## 2024-01-15 DIAGNOSIS — J98.4 PNEUMONIA WITH CAVITY OF LUNG: ICD-10-CM

## 2024-01-16 ENCOUNTER — PATIENT OUTREACH (OUTPATIENT)
Dept: PRIMARY CARE | Facility: CLINIC | Age: 64
End: 2024-01-16
Payer: COMMERCIAL

## 2024-01-16 LAB
BACTERIA BLD CULT: NORMAL
BACTERIA BLD CULT: NORMAL

## 2024-01-16 NOTE — PROGRESS NOTES
Discharge Facility:  [facility]  Discharge Diagnosis:  PE  Admission Date:  1/12/24  Discharge Date:   1/14/24    PCP Appointment Date:  Specialist Appointment Date:   Pulmonology 2/29/24    Hospital Encounter and Summary: Linked

## 2024-01-17 NOTE — SIGNIFICANT EVENT
Follow Up Phone Call    Outgoing phone call    Spoke to: Fred Menendez Relationship:self   Phone number: [phone]      Outcome: I left a message on answering machine   Chief Complaint   Patient presents with    Shortness of Breath     SOB for about a week. SOB with activity now          Diagnosis:Not applicable

## 2024-01-18 LAB
ATRIAL RATE: 85 BPM
P AXIS: 94 DEGREES
P OFFSET: 185 MS
P ONSET: 143 MS
PR INTERVAL: 124 MS
Q ONSET: 205 MS
QRS COUNT: 14 BEATS
QRS DURATION: 104 MS
QT INTERVAL: 404 MS
QTC CALCULATION(BAZETT): 480 MS
QTC FREDERICIA: 453 MS
R AXIS: -63 DEGREES
T AXIS: 91 DEGREES
T OFFSET: 407 MS
VENTRICULAR RATE: 85 BPM

## 2024-01-26 ENCOUNTER — OFFICE VISIT (OUTPATIENT)
Dept: PRIMARY CARE | Facility: CLINIC | Age: 64
End: 2024-01-26
Payer: COMMERCIAL

## 2024-01-26 VITALS
SYSTOLIC BLOOD PRESSURE: 121 MMHG | HEIGHT: 68 IN | OXYGEN SATURATION: 93 % | BODY MASS INDEX: 29.4 KG/M2 | WEIGHT: 194 LBS | HEART RATE: 74 BPM | DIASTOLIC BLOOD PRESSURE: 81 MMHG

## 2024-01-26 DIAGNOSIS — J96.01 ACUTE HYPOXIC RESPIRATORY FAILURE (MULTI): ICD-10-CM

## 2024-01-26 DIAGNOSIS — I26.99 PE (PULMONARY THROMBOEMBOLISM) (MULTI): ICD-10-CM

## 2024-01-26 DIAGNOSIS — I10 HYPERTENSION, UNSPECIFIED TYPE: Primary | ICD-10-CM

## 2024-01-26 PROCEDURE — 3074F SYST BP LT 130 MM HG: CPT

## 2024-01-26 PROCEDURE — 99214 OFFICE O/P EST MOD 30 MIN: CPT

## 2024-01-26 PROCEDURE — 3079F DIAST BP 80-89 MM HG: CPT

## 2024-01-26 ASSESSMENT — ENCOUNTER SYMPTOMS
FATIGUE: 0
CHILLS: 0
SHORTNESS OF BREATH: 0
WHEEZING: 0
COUGH: 0
PALPITATIONS: 0
FEVER: 0

## 2024-01-26 ASSESSMENT — PATIENT HEALTH QUESTIONNAIRE - PHQ9
2. FEELING DOWN, DEPRESSED OR HOPELESS: NOT AT ALL
SUM OF ALL RESPONSES TO PHQ9 QUESTIONS 1 AND 2: 0
1. LITTLE INTEREST OR PLEASURE IN DOING THINGS: NOT AT ALL

## 2024-01-26 NOTE — PROGRESS NOTES
"Subjective   Patient ID: Fred Menendez is a 63 y.o. male who presents for Hospital Follow-up.    63 yr old male was in hospital for DVT/PE pnm 1/12-1/14, breathing much improved, still smoking 2-3 per day, is wearing patch while at work. Is back to work       w    Review of Systems   Constitutional:  Negative for chills, fatigue and fever.   Respiratory:  Negative for cough, shortness of breath and wheezing.    Cardiovascular:  Negative for chest pain, palpitations and leg swelling.       Objective   /81   Pulse 74   Ht 1.727 m (5' 8\")   Wt 88 kg (194 lb)   SpO2 93%   BMI 29.50 kg/m²     Physical Exam  Vitals and nursing note reviewed.   Constitutional:       General: He is not in acute distress.     Appearance: Normal appearance. He is normal weight. He is not ill-appearing.   Cardiovascular:      Pulses: Normal pulses.      Heart sounds: Normal heart sounds.   Pulmonary:      Effort: Pulmonary effort is normal.      Breath sounds: Normal breath sounds.   Musculoskeletal:      Right lower leg: No edema.      Left lower leg: No edema.   Neurological:      Mental Status: He is alert and oriented to person, place, and time.         Fred was seen today for hospital follow-up.  Diagnoses and all orders for this visit:  Hypertension, unspecified type  Comments:  bp 121/81 today in office, has not restarted all of his meds at home yet, he is watching his bp daily  PE (pulmonary thromboembolism) (CMS/HCC)  Comments:  gave [employer] samples will work with his insurance to get prior auth finished  Acute hypoxic respiratory failure (CMS/HCC)  Comments:  breathing is unlabored today, lungs sounds clear             "

## 2024-01-29 ENCOUNTER — PATIENT OUTREACH (OUTPATIENT)
Dept: PRIMARY CARE | Facility: CLINIC | Age: 64
End: 2024-01-29
Payer: COMMERCIAL

## 2024-02-07 DIAGNOSIS — I26.99 PE (PULMONARY THROMBOEMBOLISM) (MULTI): ICD-10-CM

## 2024-02-12 ENCOUNTER — PATIENT OUTREACH (OUTPATIENT)
Dept: PRIMARY CARE | Facility: CLINIC | Age: 64
End: 2024-02-12
Payer: COMMERCIAL

## 2024-02-12 ENCOUNTER — DOCUMENTATION (OUTPATIENT)
Dept: PRIMARY CARE | Facility: CLINIC | Age: 64
End: 2024-02-12
Payer: COMMERCIAL

## 2024-02-12 NOTE — PROGRESS NOTES
Received VM from Fred.     Needs refill on eliquis.  Refill was denied per Fred.  \[facility]\"" pharmacy is going to contact MD.  Number given for pulmonologist.  He is going to try and move appt sooner to get meds and discuss with MD.  He will call this nurse if pulmonology is unable to assist.

## 2024-02-13 ENCOUNTER — DOCUMENTATION (OUTPATIENT)
Dept: PRIMARY CARE | Facility: CLINIC | Age: 64
End: 2024-02-13
Payer: COMMERCIAL

## 2024-02-13 DIAGNOSIS — I26.99 PE (PULMONARY THROMBOEMBOLISM) (MULTI): ICD-10-CM

## 2024-02-13 NOTE — PROGRESS NOTES
LVM with Fred that Dr. Gillette is going to fill the eliquis.  Check with the office and pharmacy in a bit to see if it is ready for .  Please reach out to myself if any thing else is needed.  Call back number left on machine.

## 2024-02-14 ENCOUNTER — HOSPITAL ENCOUNTER (OUTPATIENT)
Dept: RADIOLOGY | Facility: HOSPITAL | Age: 64
Discharge: HOME | End: 2024-02-14
Payer: COMMERCIAL

## 2024-02-14 DIAGNOSIS — J98.4 PNEUMONIA WITH CAVITY OF LUNG: ICD-10-CM

## 2024-02-14 DIAGNOSIS — J18.9 PNEUMONIA WITH CAVITY OF LUNG: ICD-10-CM

## 2024-02-14 PROCEDURE — 71250 CT THORAX DX C-: CPT

## 2024-02-14 PROCEDURE — 71250 CT THORAX DX C-: CPT | Performed by: RADIOLOGY

## 2024-02-19 ENCOUNTER — OFFICE VISIT (OUTPATIENT)
Dept: HEMATOLOGY/ONCOLOGY | Facility: CLINIC | Age: 64
End: 2024-02-19
Payer: COMMERCIAL

## 2024-02-19 VITALS
SYSTOLIC BLOOD PRESSURE: 130 MMHG | WEIGHT: 205.03 LBS | TEMPERATURE: 98.2 F | RESPIRATION RATE: 16 BRPM | OXYGEN SATURATION: 98 % | BODY MASS INDEX: 31.17 KG/M2 | HEART RATE: 67 BPM | DIASTOLIC BLOOD PRESSURE: 82 MMHG

## 2024-02-19 DIAGNOSIS — I26.99 PE (PULMONARY THROMBOEMBOLISM) (MULTI): Primary | ICD-10-CM

## 2024-02-19 LAB
ALBUMIN SERPL BCP-MCNC: 3.3 G/DL (ref 3.4–5)
ALP SERPL-CCNC: 90 U/L (ref 33–136)
ALT SERPL W P-5'-P-CCNC: 10 U/L (ref 10–52)
ANION GAP SERPL CALC-SCNC: 16 MMOL/L (ref 10–20)
AST SERPL W P-5'-P-CCNC: 12 U/L (ref 9–39)
BASOPHILS # BLD AUTO: 0.02 X10*3/UL (ref 0–0.1)
BASOPHILS NFR BLD AUTO: 0.3 %
BILIRUB SERPL-MCNC: 0.5 MG/DL (ref 0–1.2)
BUN SERPL-MCNC: 11 MG/DL (ref 6–23)
CALCIUM SERPL-MCNC: 8.4 MG/DL (ref 8.6–10.3)
CHLORIDE SERPL-SCNC: 102 MMOL/L (ref 98–107)
CO2 SERPL-SCNC: 27 MMOL/L (ref 21–32)
CREAT SERPL-MCNC: 1.12 MG/DL (ref 0.5–1.3)
EGFRCR SERPLBLD CKD-EPI 2021: 74 ML/MIN/1.73M*2
EOSINOPHIL # BLD AUTO: 0.45 X10*3/UL (ref 0–0.7)
EOSINOPHIL NFR BLD AUTO: 6.3 %
ERYTHROCYTE [DISTWIDTH] IN BLOOD BY AUTOMATED COUNT: 16.1 % (ref 11.5–14.5)
GLUCOSE SERPL-MCNC: 85 MG/DL (ref 74–99)
HCT VFR BLD AUTO: 38.3 % (ref 41–52)
HGB BLD-MCNC: 12.6 G/DL (ref 13.5–17.5)
IMM GRANULOCYTES # BLD AUTO: 0.01 X10*3/UL (ref 0–0.7)
IMM GRANULOCYTES NFR BLD AUTO: 0.1 % (ref 0–0.9)
LDH SERPL L TO P-CCNC: 130 U/L (ref 84–246)
LYMPHOCYTES # BLD AUTO: 2.98 X10*3/UL (ref 1.2–4.8)
LYMPHOCYTES NFR BLD AUTO: 41.9 %
MCH RBC QN AUTO: 31.1 PG (ref 26–34)
MCHC RBC AUTO-ENTMCNC: 32.9 G/DL (ref 32–36)
MCV RBC AUTO: 95 FL (ref 80–100)
MONOCYTES # BLD AUTO: 0.58 X10*3/UL (ref 0.1–1)
MONOCYTES NFR BLD AUTO: 8.1 %
NEUTROPHILS # BLD AUTO: 3.08 X10*3/UL (ref 1.2–7.7)
NEUTROPHILS NFR BLD AUTO: 43.3 %
PLATELET # BLD AUTO: 183 X10*3/UL (ref 150–450)
POTASSIUM SERPL-SCNC: 4 MMOL/L (ref 3.5–5.3)
PROT SERPL-MCNC: 6.1 G/DL (ref 6.4–8.2)
RBC # BLD AUTO: 4.05 X10*6/UL (ref 4.5–5.9)
SODIUM SERPL-SCNC: 141 MMOL/L (ref 136–145)
WBC # BLD AUTO: 7.1 X10*3/UL (ref 4.4–11.3)

## 2024-02-19 PROCEDURE — 86146 BETA-2 GLYCOPROTEIN ANTIBODY: CPT | Performed by: PHYSICIAN ASSISTANT

## 2024-02-19 PROCEDURE — 83615 LACTATE (LD) (LDH) ENZYME: CPT | Performed by: PHYSICIAN ASSISTANT

## 2024-02-19 PROCEDURE — 3075F SYST BP GE 130 - 139MM HG: CPT | Performed by: PHYSICIAN ASSISTANT

## 2024-02-19 PROCEDURE — 99215 OFFICE O/P EST HI 40 MIN: CPT | Performed by: PHYSICIAN ASSISTANT

## 2024-02-19 PROCEDURE — 81241 F5 GENE: CPT | Performed by: PHYSICIAN ASSISTANT

## 2024-02-19 PROCEDURE — 85025 COMPLETE CBC W/AUTO DIFF WBC: CPT | Performed by: PHYSICIAN ASSISTANT

## 2024-02-19 PROCEDURE — 3079F DIAST BP 80-89 MM HG: CPT | Performed by: PHYSICIAN ASSISTANT

## 2024-02-19 PROCEDURE — 99205 OFFICE O/P NEW HI 60 MIN: CPT | Performed by: PHYSICIAN ASSISTANT

## 2024-02-19 PROCEDURE — 36415 COLL VENOUS BLD VENIPUNCTURE: CPT | Performed by: PHYSICIAN ASSISTANT

## 2024-02-19 PROCEDURE — G0452 MOLECULAR PATHOLOGY INTERPR: HCPCS | Performed by: PHYSICIAN ASSISTANT

## 2024-02-19 PROCEDURE — 85302 CLOT INHIBIT PROT C ANTIGEN: CPT | Performed by: PHYSICIAN ASSISTANT

## 2024-02-19 PROCEDURE — 80053 COMPREHEN METABOLIC PANEL: CPT | Performed by: PHYSICIAN ASSISTANT

## 2024-02-19 PROCEDURE — 81240 F2 GENE: CPT | Performed by: PHYSICIAN ASSISTANT

## 2024-02-19 PROCEDURE — 86147 CARDIOLIPIN ANTIBODY EA IG: CPT | Performed by: PHYSICIAN ASSISTANT

## 2024-02-19 ASSESSMENT — PAIN SCALES - GENERAL: PAINLEVEL: 0-NO PAIN

## 2024-02-19 NOTE — PROGRESS NOTES
Reason for Visit  Fred Menendez is a 63 y.o. male, current everyday smoker referred by Dr. Yañez for evaluation and management of B/L pulmonary emboli.    PMH/PSH: HL diagnosed in 1986 treated with MOPP and splenectomy, HTN, HL, Cataracts, BPH.  FH: Mother with PE  Soc Hx: Every day smoker; drinks Hanna/coke per night x many years; denies illicit drugs; Manager,at [employer]; single.     History of Present Illness:  Admitted in 1/2024 with 1 week of dyspnea on exertion and productive cough.  He was hypoxic to 88% on room air.  CT angiogram revealed a left-sided cavitary pneumonia and bilateral pulmonary emboli without CT evidence of right heart strain.  He was started on heparin and antibiotics.  Pulmonology was consulted.  He was transitioned to Eliquis.  His oxygen was weaned to room air at rest, but he did desaturate with exertion so was prescribed home oxygen.  Pulmonology recommended 10 more days of oral Augmentin and repeat CT imaging in 4 weeks to monitor cavitary pneumonia.     On assessment, patient reports no provoking factor such as prolonged immobilization, illness, surgery, aside from smoking. Mother had PE. Continues on Eliquis 5 mg BID. Denies any bleeding or excessive bruising.Had c-scope in 2020, unremarkable. No longer on oxygen. Trying to quite smoking, on Nicotine patch. Otherwise feeing well. Denies f/c/night sweats, n/v/d/abd pain, SOB, CP, neuropathy, rash or any other complaints at this time.     Review of Systems: All of the systems have been reviewed and are negative for complaints except what is stated in the HPI and/or past medical history.    Allergies and Intolerances:  No Known Allergies         Outpatient Medication Profile:  Current Outpatient Medications   Medication Sig Dispense Refill    amLODIPine (Norvasc) 10 mg tablet Take 1 tablet (10 mg) by mouth once daily. (Patient taking differently: Take 1 tablet (10 mg) by mouth once daily in the morning.) 90 tablet 3    apixaban  "(Eliquis) 5 mg (74 tabs) tablet Take 1 tablet (5 mg) by mouth 2 times a day. 180 tablet 3    atorvastatin (Lipitor) 40 mg tablet Take 1 tablet (40 mg) by mouth once daily at bedtime. 90 tablet 3    carvedilol (Coreg) 25 mg tablet Take 1 tablet (25 mg) by mouth 2 times a day. 180 tablet 3    chlorthalidone (Hygroton) 25 mg tablet Take 1 tablet (25 mg) by mouth once daily. (Patient taking differently: Take 1 tablet (25 mg) by mouth once daily in the morning.) 90 tablet 3    doxazosin (Cardura) 4 mg tablet Take 1 tablet (4 mg) by mouth once daily. (Patient taking differently: Take 1 tablet (4 mg) by mouth once daily in the morning.) 90 tablet 3    lisinopril 20 mg tablet Take 2 tablets (40 mg) by mouth once daily. 180 tablet 3    nicotine (Nicoderm CQ) 14 mg/24 hr patch Place 1 patch over 24 hours on the skin once every 24 hours for 28 days. 28 patch 0     No current facility-administered medications for this visit.        Vitals and Measurements:   Visit Vitals  /82 (BP Location: Left arm)   Pulse 67   Temp 36.8 °C (98.2 °F)   Resp 16      Physical Exam:   Constitutional: alert, awake and oriented, not in acute distress   HEENT: moist mucous membranes, normal nose   Neck: supple, no lymphadenopathy   EYES: PERRL, EOM intact, conjunctiva normal  Skin: no jaundice, rash or erythema  Neurological: AAOx3, no gross focal deficit   Psychiatric: normal mood and behavior     Lab Results   Component Value Date    WBC 7.1 02/19/2024    NEUTROABS 3.08 02/19/2024    IGABSOL 0.01 02/19/2024    LYMPHSABS 2.98 02/19/2024    MONOSABS 0.58 02/19/2024    EOSABS 0.45 02/19/2024    BASOSABS 0.02 02/19/2024    RBC 4.05 (L) 02/19/2024    MCV 95 02/19/2024    MCHC 32.9 02/19/2024    HGB 12.6 (L) 02/19/2024    HCT 38.3 (L) 02/19/2024     02/19/2024     No results found for: \"RETICCTPCT\"   Lab Results   Component Value Date    CREATININE 1.12 02/19/2024    BUN 11 02/19/2024    EGFR 74 02/19/2024     02/19/2024    K 4.0 " 02/19/2024     02/19/2024    CO2 27 02/19/2024      Lab Results   Component Value Date    ALT 10 02/19/2024    AST 12 02/19/2024    ALKPHOS 90 02/19/2024    BILITOT 0.5 02/19/2024      Lab Results   Component Value Date    TSH 1.13 03/31/2021     Lab Results   Component Value Date    TSH 1.13 03/31/2021     Lab Results   Component Value Date     02/19/2024       Assessment:    63 y.o. male, current everyday smoker referred by Dr. Yañez for evaluation and management of B/L pulmonary embolus. On Eliquis since 1/2024.     HL diagnosed in 1986 treated with MOPP and splenectomy,       Plan:    Reviewed and discussed lab, imaging, and pathology results with patient in detail as well as diagnosis, prognosis, and treatment options.    Discussed completing 6 month of Eliquis    Will perform hypercoagulable work up    F/U with pulmonary for repeat CT imaging and to monitor cavitary pneumonia.     F/U w/PCP    Patient verbalized understanding, and all his questions were answered to his satisfaction    60 min spent with patient greater than 50 % of which was spent in consultation and coordination of care.

## 2024-02-20 ENCOUNTER — TELEPHONE (OUTPATIENT)
Dept: HEMATOLOGY/ONCOLOGY | Facility: CLINIC | Age: 64
End: 2024-02-20
Payer: COMMERCIAL

## 2024-02-20 DIAGNOSIS — I26.99 PE (PULMONARY THROMBOEMBOLISM) (MULTI): ICD-10-CM

## 2024-02-20 LAB
B2 GLYCOPROT1 IGA SER-ACNC: <0.6 U/ML
B2 GLYCOPROT1 IGG SER-ACNC: <1.4 U/ML
B2 GLYCOPROT1 IGM SER-ACNC: 0.2 U/ML
CARDIOLIPIN IGA SERPL-ACNC: 0.7 APL U/ML
CARDIOLIPIN IGG SER IA-ACNC: <1.6 GPL U/ML
CARDIOLIPIN IGM SER IA-ACNC: 0.2 MPL U/ML

## 2024-02-20 NOTE — TELEPHONE ENCOUNTER
Pt does need to have labs redrawn, orders entered per [facility] and pt notified. He will have labs drawn this week.

## 2024-02-23 DIAGNOSIS — I26.99 PE (PULMONARY THROMBOEMBOLISM) (MULTI): ICD-10-CM

## 2024-02-23 DIAGNOSIS — J98.4 CAVITATING MASS IN LEFT UPPER LUNG LOBE: Primary | ICD-10-CM

## 2024-02-23 DIAGNOSIS — Z01.818 PRE-OP TESTING: ICD-10-CM

## 2024-02-23 NOTE — PROGRESS NOTES
Bronchoscopy Scheduling Request    Pre-bronchoscopy visit: Follow-up visit with Bronchoscopy group provider  Please schedule procedure: Next available    Cytology on-site:  Yes  Location:  Either location  Performing physician:  Advanced diagnostic bronchoscopist  Referring physician:  Ana Paula Rincon MD, Allison Gillette, APRN-CNP  Indication:  ASHLEIGH cavitary mass  Sedation / Anesthesia:  GA  Procedure:  Navigational bronchoscopy, Staging EBUS  Time:  Tier 3  Fluorscopy:   Yes  Imaging needed:  CT Elian - same day as procedure  Labs:  None  Meds:  Eliquis  Special Considerations:   Echocardiogram prior to bronch, consider cultures if no cancer on SON  Reviewed by:  Joe Brady MD

## 2024-02-26 LAB
ELECTRONICALLY SIGNED BY: ABNORMAL
ELECTRONICALLY SIGNED BY: NORMAL
F2 C.20210G>A GENO BLD/T: ABNORMAL
F2 C.20210G>A GENO BLD/T: ABNORMAL
F5 P.R506Q BLD/T QL: NORMAL
F5 P.R506Q BLD/T QL: NORMAL

## 2024-02-29 ENCOUNTER — APPOINTMENT (OUTPATIENT)
Dept: PULMONOLOGY | Facility: CLINIC | Age: 64
End: 2024-02-29
Payer: COMMERCIAL

## 2024-03-19 ENCOUNTER — TELEMEDICINE (OUTPATIENT)
Dept: HEMATOLOGY/ONCOLOGY | Facility: CLINIC | Age: 64
End: 2024-03-19
Payer: COMMERCIAL

## 2024-03-19 DIAGNOSIS — I26.99 PE (PULMONARY THROMBOEMBOLISM) (MULTI): Primary | ICD-10-CM

## 2024-03-19 PROCEDURE — 99214 OFFICE O/P EST MOD 30 MIN: CPT | Performed by: PHYSICIAN ASSISTANT

## 2024-03-19 NOTE — PROGRESS NOTES
Visit Type: Benign Heme Follow-up, Virtual Visit:  A Virtual visit (telephone only) between the patient (at the originating site) and the provider (at the distant site) was utilized to provide this telehealth service.   Verbal Consent for Encounter: Verbal consent was requested and obtained from patient, or from parent/guardian if minor, on this date for a telehealth visit.     Reason for Visit  Fred Menendez is a 63 y.o. male, current everyday smoker referred by Dr. Yañez for evaluation and management of B/L pulmonary emboli.    Admitted in 1/2024 with 1 week of dyspnea on exertion and productive cough.  He was hypoxic to 88% on room air.  CT angiogram revealed a left-sided cavitary pneumonia and bilateral pulmonary emboli without CT evidence of right heart strain.  He was started on heparin and antibiotics.  Pulmonology was consulted.  He was transitioned to Eliquis.  His oxygen was weaned to room air at rest, but he did desaturate with exertion so was prescribed home oxygen.  Pulmonology recommended 10 more days of oral Augmentin and repeat CT imaging in 4 weeks to monitor cavitary pneumonia.     On assessment, patient reports no provoking factor such as prolonged immobilization, illness, surgery, aside from smoking. Mother had PE. Continues on Eliquis 5 mg BID. Denies any bleeding or excessive bruising.Had c-scope in 2020, unremarkable. No longer on oxygen. Trying to quite smoking, on Nicotine patch. Otherwise feeing well. Denies f/c/night sweats, n/v/d/abd pain, SOB, CP, neuropathy, rash or any other complaints at this time.     PMH/PSH: HL diagnosed in 1986 treated with MOPP and splenectomy, HTN, HL, Cataracts, BPH.  FH: Mother with PE  Soc Hx: Every day smoker; drinks [place]/coke per night x many years; denies illicit drugs; Manager,at [employer]; single.     History of Present Illness:  No new complaints. Continues on Eliquis w/o complications.    Review of Systems: All of the systems have been reviewed  "and are negative for complaints except what is stated in the HPI and/or past medical history.    Allergies and Intolerances:  No Known Allergies         Outpatient Medication Profile:  Current Outpatient Medications   Medication Sig Dispense Refill    amLODIPine (Norvasc) 10 mg tablet Take 1 tablet (10 mg) by mouth once daily. (Patient taking differently: Take 1 tablet (10 mg) by mouth once daily in the morning.) 90 tablet 3    apixaban (Eliquis) 5 mg (74 tabs) tablet Take 1 tablet (5 mg) by mouth 2 times a day. 180 tablet 3    atorvastatin (Lipitor) 40 mg tablet Take 1 tablet (40 mg) by mouth once daily at bedtime. 90 tablet 3    carvedilol (Coreg) 25 mg tablet Take 1 tablet (25 mg) by mouth 2 times a day. 180 tablet 3    chlorthalidone (Hygroton) 25 mg tablet Take 1 tablet (25 mg) by mouth once daily. (Patient taking differently: Take 1 tablet (25 mg) by mouth once daily in the morning.) 90 tablet 3    doxazosin (Cardura) 4 mg tablet Take 1 tablet (4 mg) by mouth once daily. (Patient taking differently: Take 1 tablet (4 mg) by mouth once daily in the morning.) 90 tablet 3    lisinopril 20 mg tablet Take 2 tablets (40 mg) by mouth once daily. 180 tablet 3    nicotine (Nicoderm CQ) 14 mg/24 hr patch Place 1 patch over 24 hours on the skin once every 24 hours for 28 days. 28 patch 0     No current facility-administered medications for this visit.        Vitals and Measurements:   There were no vitals taken for this visit.     Physical Exam:   Deferred due to telehealth    Lab Results   Component Value Date    WBC 7.1 02/19/2024    NEUTROABS 3.08 02/19/2024    IGABSOL 0.01 02/19/2024    LYMPHSABS 2.98 02/19/2024    MONOSABS 0.58 02/19/2024    EOSABS 0.45 02/19/2024    BASOSABS 0.02 02/19/2024    RBC 4.05 (L) 02/19/2024    MCV 95 02/19/2024    MCHC 32.9 02/19/2024    HGB 12.6 (L) 02/19/2024    HCT 38.3 (L) 02/19/2024     02/19/2024     No results found for: \"RETICCTPCT\"   Lab Results   Component Value Date    " CREATININE 1.12 02/19/2024    BUN 11 02/19/2024    EGFR 74 02/19/2024     02/19/2024    K 4.0 02/19/2024     02/19/2024    CO2 27 02/19/2024      Lab Results   Component Value Date    ALT 10 02/19/2024    AST 12 02/19/2024    ALKPHOS 90 02/19/2024    BILITOT 0.5 02/19/2024      Lab Results   Component Value Date    TSH 1.13 03/31/2021     Lab Results   Component Value Date    TSH 1.13 03/31/2021     Lab Results   Component Value Date     02/19/2024     Office Visit on 02/19/2024   Component Date Value Ref Range Status    Anticardiolipin IgA 02/19/2024 0.7  <20.0 APL U/mL Final    Elevated levels of IgA anti-cardiolipin have not been included  in the laboratory criteria for anti-phospholipid syndrome  according to an international consensus (J Thromb Haemost 2006 4:295).  It may be helpful in identifying subgroups of patients at risk for  specific clinical manifestations of anti-phospholipid syndrome.    Anticardiolipin IgG 02/19/2024 <1.6  <20.0 GPL U/mL Final    Elevated levels of IgG anti-cardiolipin on 2 occasions at least  12 weeks apart are laboratory criteria for anti-phospholipid  syndrome according to an international consensus (J Thromb Haemost  2006 4:295).    Anticardiolipin IgM 02/19/2024 0.2  <20.0 MPL U/mL Final    Elevated levels of IgM anti-cardiolipin on 2 occasions at least  12 weeks apart are laboratory criteria for anti-phospholipid  syndrome according to an international consensus (J Thromb Haemost  2006 4:295). IgM anti-cardiolipin tends to give false positive  results in the low positive range, especially in the  presence of rheumatoid factor or cryoglobulins.    Beta-2 Glyco 1 IgG 02/19/2024 <1.4  <20.0 U/mL Final    Elevated levels of IgG anti-Beta 2 Glycoprotein-I on 2 occasions  at least 12 weeks apart are laboratory criteria for anti-phospholipid  syndrome according to an international consensus (J Thromb Haemost  2006 4:295).    Beta-2 Glyco 1 IgA 02/19/2024 <0.6   <20.0 U/mL Final    Elevated levels of IgA anti-Beta 2 Glycoprotein-I have not been  included in the laboratory criteria for anti-phospholipid syndrome  according to an international consensus (J Thromb Haemost 2006 4:295).  It may be helpful in identifying subgroups of patients at risk for  specific clinical manifestations of anti-phospholipid syndrome. A  significant proportion of IgA anti-Beta 2 Glycoprotein- positive  tests has no apparent association with any clinical manifestation  of anti-phospholipid syndrome.    Beta 2 Glyco 1 IgM 02/19/2024 0.2  <20.0 U/mL Final    Elevated levels of IgM anti-Beta 2 Glycoprotein-I on 2 occasions  at least 12 weeks apart are laboratory criteria for anti-phospholipid  syndrome according to an international consensus (J Thromb Haemost  2006 4:295). IgM anti-Beta 2 Glycoprotein-I tends to give false  positive results in the low positive range, especially in the  presence of rheumatoid factor or cryoglobulins.    Factor V Leiden Result 02/19/2024 Normal  Normal Final    Factor V Leiden Interpretation 02/19/2024    Final                    Value:This result contains rich text formatting which cannot be displayed here.    Electronically signed and reported* 02/19/2024    Final                    Value:Pinky Cormier MD    Factor II-Prothrombin Result 02/19/2024 Heterozygous (A)  Normal Final    Factor II-Prothrombin Interpretati* 02/19/2024    Final                    Value:This result contains rich text formatting which cannot be displayed here.    Electronically signed and reported* 02/19/2024    Final                    Value:Pinky Cormier MD    WBC 02/19/2024 7.1  4.4 - 11.3 x10*3/uL Final    RBC 02/19/2024 4.05 (L)  4.50 - 5.90 x10*6/uL Final    Hemoglobin 02/19/2024 12.6 (L)  13.5 - 17.5 g/dL Final    Hematocrit 02/19/2024 38.3 (L)  41.0 - 52.0 % Final    MCV 02/19/2024 95  80 - 100 fL Final    MCH 02/19/2024 31.1  26.0 - 34.0 pg Final    MCHC 02/19/2024 32.9  32.0 - 36.0  g/dL Final    RDW 02/19/2024 16.1 (H)  11.5 - 14.5 % Final    Platelets 02/19/2024 183  150 - 450 x10*3/uL Final    Neutrophils % 02/19/2024 43.3  40.0 - 80.0 % Final    Immature Granulocytes %, Automated 02/19/2024 0.1  0.0 - 0.9 % Final    Immature Granulocyte Count (IG) includes promyelocytes, myelocytes and metamyelocytes but does not include bands. Percent differential counts (%) should be interpreted in the context of the absolute cell counts (cells/UL).    Lymphocytes % 02/19/2024 41.9  13.0 - 44.0 % Final    Monocytes % 02/19/2024 8.1  2.0 - 10.0 % Final    Eosinophils % 02/19/2024 6.3  0.0 - 6.0 % Final    Basophils % 02/19/2024 0.3  0.0 - 2.0 % Final    Neutrophils Absolute 02/19/2024 3.08  1.20 - 7.70 x10*3/uL Final    Percent differential counts (%) should be interpreted in the context of the absolute cell counts (cells/uL).    Immature Granulocytes Absolute, Au* 02/19/2024 0.01  0.00 - 0.70 x10*3/uL Final    Lymphocytes Absolute 02/19/2024 2.98  1.20 - 4.80 x10*3/uL Final    Monocytes Absolute 02/19/2024 0.58  0.10 - 1.00 x10*3/uL Final    Eosinophils Absolute 02/19/2024 0.45  0.00 - 0.70 x10*3/uL Final    Basophils Absolute 02/19/2024 0.02  0.00 - 0.10 x10*3/uL Final    Glucose 02/19/2024 85  74 - 99 mg/dL Final    Sodium 02/19/2024 141  136 - 145 mmol/L Final    Potassium 02/19/2024 4.0  3.5 - 5.3 mmol/L Final    Chloride 02/19/2024 102  98 - 107 mmol/L Final    Bicarbonate 02/19/2024 27  21 - 32 mmol/L Final    Anion Gap 02/19/2024 16  10 - 20 mmol/L Final    Urea Nitrogen 02/19/2024 11  6 - 23 mg/dL Final    Creatinine 02/19/2024 1.12  0.50 - 1.30 mg/dL Final    eGFR 02/19/2024 74  >60 mL/min/1.73m*2 Final    Calculations of estimated GFR are performed using the 2021 CKD-EPI Study Refit equation without the race variable for the IDMS-Traceable creatinine methods.  https://jasn.asnjournals.org/content/early/2021/09/22/ASN.4796854832    Calcium 02/19/2024 8.4 (L)  8.6 - 10.3 mg/dL Final    Albumin  02/19/2024 3.3 (L)  3.4 - 5.0 g/dL Final    Alkaline Phosphatase 02/19/2024 90  33 - 136 U/L Final    Total Protein 02/19/2024 6.1 (L)  6.4 - 8.2 g/dL Final    AST 02/19/2024 12  9 - 39 U/L Final    Bilirubin, Total 02/19/2024 0.5  0.0 - 1.2 mg/dL Final    ALT 02/19/2024 10  10 - 52 U/L Final    Patients treated with Sulfasalazine may generate falsely decreased results for ALT.    LDH 02/19/2024 130  84 - 246 U/L Final   Admission on 01/12/2024, Discharged on 01/14/2024   Component Date Value Ref Range Status    Extra Tube 01/12/2024 Hold for add-ons.   Final    Auto resulted.    Extra Tube 01/12/2024 Hold for add-ons.   Final    Auto resulted.    Extra Tube 01/12/2024 Hold for add-ons.   Final    Auto resulted.    Extra Tube 01/12/2024 Hold for add-ons.   Final    Auto resulted.    Extra Tube 01/12/2024 Hold for add-ons.   Final    Auto resulted.    Extra Tube 01/12/2024 Hold for add-ons.   Final    Auto resulted.    WBC 01/12/2024 20.0 (H)  4.4 - 11.3 x10*3/uL Final    nRBC 01/12/2024 0.0  0.0 - 0.0 /100 WBCs Final    RBC 01/12/2024 4.50  4.50 - 5.90 x10*6/uL Final    Hemoglobin 01/12/2024 14.4  13.5 - 17.5 g/dL Final    Hematocrit 01/12/2024 40.9 (L)  41.0 - 52.0 % Final    MCV 01/12/2024 91  80 - 100 fL Final    MCH 01/12/2024 32.0  26.0 - 34.0 pg Final    MCHC 01/12/2024 35.2  32.0 - 36.0 g/dL Final    RDW 01/12/2024 14.5  11.5 - 14.5 % Final    Platelets 01/12/2024 154  150 - 450 x10*3/uL Final    Neutrophils % 01/12/2024 79.7  40.0 - 80.0 % Final    Immature Granulocytes %, Automated 01/12/2024 0.7  0.0 - 0.9 % Final    Immature Granulocyte Count (IG) includes promyelocytes, myelocytes and metamyelocytes but does not include bands. Percent differential counts (%) should be interpreted in the context of the absolute cell counts (cells/UL).    Lymphocytes % 01/12/2024 9.3  13.0 - 44.0 % Final    Monocytes % 01/12/2024 10.0  2.0 - 10.0 % Final    Eosinophils % 01/12/2024 0.2  0.0 - 6.0 % Final    Basophils %  01/12/2024 0.1  0.0 - 2.0 % Final    Neutrophils Absolute 01/12/2024 15.93 (H)  1.20 - 7.70 x10*3/uL Final    Percent differential counts (%) should be interpreted in the context of the absolute cell counts (cells/uL).    Immature Granulocytes Absolute, Au* 01/12/2024 0.15  0.00 - 0.70 x10*3/uL Final    Lymphocytes Absolute 01/12/2024 1.86  1.20 - 4.80 x10*3/uL Final    Monocytes Absolute 01/12/2024 2.00 (H)  0.10 - 1.00 x10*3/uL Final    Eosinophils Absolute 01/12/2024 0.04  0.00 - 0.70 x10*3/uL Final    Basophils Absolute 01/12/2024 0.03  0.00 - 0.10 x10*3/uL Final    Glucose 01/12/2024 126 (H)  74 - 99 mg/dL Final    Sodium 01/12/2024 136  136 - 145 mmol/L Final    Potassium 01/12/2024 3.3 (L)  3.5 - 5.3 mmol/L Final    Chloride 01/12/2024 99  98 - 107 mmol/L Final    Bicarbonate 01/12/2024 26  21 - 32 mmol/L Final    Anion Gap 01/12/2024 14  10 - 20 mmol/L Final    Urea Nitrogen 01/12/2024 43 (H)  6 - 23 mg/dL Final    Creatinine 01/12/2024 1.79 (H)  0.50 - 1.30 mg/dL Final    eGFR 01/12/2024 42 (L)  >60 mL/min/1.73m*2 Final    Calculations of estimated GFR are performed using the 2021 CKD-EPI Study Refit equation without the race variable for the IDMS-Traceable creatinine methods.  https://jasn.asnjournals.org/content/early/2021/09/22/ASN.5474325613    Calcium 01/12/2024 8.9  8.6 - 10.3 mg/dL Final    Albumin 01/12/2024 3.3 (L)  3.4 - 5.0 g/dL Final    Alkaline Phosphatase 01/12/2024 79  33 - 136 U/L Final    Total Protein 01/12/2024 7.2  6.4 - 8.2 g/dL Final    AST 01/12/2024 45 (H)  9 - 39 U/L Final    Bilirubin, Total 01/12/2024 1.4 (H)  0.0 - 1.2 mg/dL Final    ALT 01/12/2024 42  10 - 52 U/L Final    Patients treated with Sulfasalazine may generate falsely decreased results for ALT.    Troponin I, High Sensitivity 01/12/2024 43 (H)  0 - 20 ng/L Final    BNP 01/12/2024 247 (H)  0 - 99 pg/mL Final    Flu A Result 01/12/2024 Not Detected  Not Detected Final    Flu B Result 01/12/2024 Not Detected  Not Detected  Final    Coronavirus 2019, PCR 01/12/2024 Not Detected  Not Detected Final    RSV PCR 01/12/2024 Not Detected  Not Detected Final    POCT pH, Venous 01/12/2024 7.50 (H)  7.33 - 7.43 pH Final    POCT pCO2, Venous 01/12/2024 35 (L)  41 - 51 mm Hg Final    POCT pO2, Venous 01/12/2024 51 (H)  35 - 45 mm Hg Final    POCT SO2, Venous 01/12/2024 86 (H)  45 - 75 % Final    POCT Oxy Hemoglobin, Venous 01/12/2024 82.7 (H)  45.0 - 75.0 % Final    POCT Base Excess, Venous 01/12/2024 4.3 (H)  -2.0 - 3.0 mmol/L Final    POCT HCO3 Calculated, Venous 01/12/2024 27.3 (H)  22.0 - 26.0 mmol/L Final    Patient Temperature 01/12/2024 37.0  degrees Celsius Final    NOTE: Patient Results are Not Corrected for Temperature    FiO2 01/12/2024 21  % Final    Ventricular Rate 01/12/2024 85  BPM Final    Atrial Rate 01/12/2024 85  BPM Final    ID Interval 01/12/2024 124  ms Final    QRS Duration 01/12/2024 104  ms Final    QT Interval 01/12/2024 404  ms Final    QTC Calculation(Bazett) 01/12/2024 480  ms Final    P Axis 01/12/2024 94  degrees Final    R Axis 01/12/2024 -63  degrees Final    T Axis 01/12/2024 91  degrees Final    QRS Count 01/12/2024 14  beats Final    Q Onset 01/12/2024 205  ms Final    P Onset 01/12/2024 143  ms Final    P Offset 01/12/2024 185  ms Final    T Offset 01/12/2024 407  ms Final    QTC Fredericia 01/12/2024 453  ms Final    Blood Culture 01/12/2024 No growth at 4 days -  FINAL REPORT   Final    Blood Culture 01/12/2024 No growth at 4 days -  FINAL REPORT   Final    MRSA PCR 01/13/2024 Not Detected  Not Detected Final    Heparin Unfractionated 01/12/2024 0.7  See Comment Below for Therapeutic Ranges IU/mL Final    Heparin Unfractionated 01/12/2024 0.5  See Comment Below for Therapeutic Ranges IU/mL Final    Procalcitonin 01/13/2024 0.84 (H)  <=0.07 ng/mL Final    WBC 01/13/2024 15.1 (H)  4.4 - 11.3 x10*3/uL Final    nRBC 01/13/2024 0.0  0.0 - 0.0 /100 WBCs Final    RBC 01/13/2024 4.34 (L)  4.50 - 5.90 x10*6/uL Final     Hemoglobin 01/13/2024 13.6  13.5 - 17.5 g/dL Final    Hematocrit 01/13/2024 40.6 (L)  41.0 - 52.0 % Final    MCV 01/13/2024 94  80 - 100 fL Final    MCH 01/13/2024 31.3  26.0 - 34.0 pg Final    MCHC 01/13/2024 33.5  32.0 - 36.0 g/dL Final    RDW 01/13/2024 14.8 (H)  11.5 - 14.5 % Final    Platelets 01/13/2024 166  150 - 450 x10*3/uL Final    Glucose 01/13/2024 111 (H)  74 - 99 mg/dL Final    Sodium 01/13/2024 136  136 - 145 mmol/L Final    Potassium 01/13/2024 3.5  3.5 - 5.3 mmol/L Final    Chloride 01/13/2024 99  98 - 107 mmol/L Final    Bicarbonate 01/13/2024 25  21 - 32 mmol/L Final    Anion Gap 01/13/2024 16  10 - 20 mmol/L Final    Urea Nitrogen 01/13/2024 49 (H)  6 - 23 mg/dL Final    Creatinine 01/13/2024 1.72 (H)  0.50 - 1.30 mg/dL Final    eGFR 01/13/2024 44 (L)  >60 mL/min/1.73m*2 Final    Calculations of estimated GFR are performed using the 2021 CKD-EPI Study Refit equation without the race variable for the IDMS-Traceable creatinine methods.  https://jasn.asnjournals.org/content/early/2021/09/22/ASN.5048910216    Calcium 01/13/2024 8.7  8.6 - 10.3 mg/dL Final    Vancomycin 01/13/2024 9.7  5.0 - 20.0 ug/mL Final    Heparin Unfractionated 01/13/2024 0.3  See Comment Below for Therapeutic Ranges IU/mL Final    WBC 01/14/2024 11.2  4.4 - 11.3 x10*3/uL Final    nRBC 01/14/2024 0.0  0.0 - 0.0 /100 WBCs Final    RBC 01/14/2024 4.40 (L)  4.50 - 5.90 x10*6/uL Final    Hemoglobin 01/14/2024 13.9  13.5 - 17.5 g/dL Final    Hematocrit 01/14/2024 42.9  41.0 - 52.0 % Final    MCV 01/14/2024 98  80 - 100 fL Final    MCH 01/14/2024 31.6  26.0 - 34.0 pg Final    MCHC 01/14/2024 32.4  32.0 - 36.0 g/dL Final    RDW 01/14/2024 15.1 (H)  11.5 - 14.5 % Final    Platelets 01/14/2024 189  150 - 450 x10*3/uL Final    Glucose 01/14/2024 109 (H)  74 - 99 mg/dL Final    Sodium 01/14/2024 136  136 - 145 mmol/L Final    Potassium 01/14/2024 3.4 (L)  3.5 - 5.3 mmol/L Final    Chloride 01/14/2024 100  98 - 107 mmol/L Final     Bicarbonate 01/14/2024 19 (L)  21 - 32 mmol/L Final    Anion Gap 01/14/2024 20  10 - 20 mmol/L Final    Urea Nitrogen 01/14/2024 46 (H)  6 - 23 mg/dL Final    Creatinine 01/14/2024 1.63 (H)  0.50 - 1.30 mg/dL Final    eGFR 01/14/2024 47 (L)  >60 mL/min/1.73m*2 Final    Calculations of estimated GFR are performed using the 2021 CKD-EPI Study Refit equation without the race variable for the IDMS-Traceable creatinine methods.  https://jasn.asnjournals.org/content/early/2021/09/22/ASN.3269903503    Calcium 01/14/2024 9.1  8.6 - 10.3 mg/dL Final     Assessment:    63 y.o. male, current everyday smoker referred by Dr. Yañez for evaluation and management of B/L pulmonary embolus. On Eliquis since 1/2024.    No provoking factor such as prolonged immobilization, illness, surgery, aside from smoking. Mother had PE    HL diagnosed in 1986 treated with MOPP and splenectomy,     CT angiogram revealed a left-sided cavitary pneumonia and bilateral pulmonary emboli without CT evidence of right heart strain.     Plan:    Reviewed and discussed lab, imaging, and pathology results with patient in detail as well as diagnosis, prognosis, and treatment options.    Hypercoagulable work up c/w heterozygous for Prothrombin. Work needs to be completed with Lupus Anticoagulant, Protein C and S.    Discussed life long AC due to unprovoked nature of PE     Will perform hypercoagulable work up    F/U with pulmonary for repeat CT imaging and to monitor cavitary pneumonia; plan for bronch next week.     F/U w/PCP    Case discussed with Dr. Webb.    RTC in 4 months     Patient verbalized understanding, and all his questions were answered to his satisfaction    30 min spent with patient greater than 50 % of which was spent in consultation and coordination of care.

## 2024-03-20 ENCOUNTER — LAB (OUTPATIENT)
Dept: LAB | Facility: LAB | Age: 64
End: 2024-03-20
Payer: COMMERCIAL

## 2024-03-20 ENCOUNTER — HOSPITAL ENCOUNTER (OUTPATIENT)
Dept: CARDIOLOGY | Facility: HOSPITAL | Age: 64
Discharge: HOME | End: 2024-03-20
Payer: COMMERCIAL

## 2024-03-20 DIAGNOSIS — Z01.818 PRE-OP TESTING: ICD-10-CM

## 2024-03-20 DIAGNOSIS — I26.99 PE (PULMONARY THROMBOEMBOLISM) (MULTI): ICD-10-CM

## 2024-03-20 DIAGNOSIS — J98.4 CAVITATING MASS IN LEFT UPPER LUNG LOBE: ICD-10-CM

## 2024-03-20 LAB
ANION GAP SERPL CALC-SCNC: 13 MMOL/L (ref 10–20)
BUN SERPL-MCNC: 19 MG/DL (ref 6–23)
CALCIUM SERPL-MCNC: 9.5 MG/DL (ref 8.6–10.3)
CHLORIDE SERPL-SCNC: 100 MMOL/L (ref 98–107)
CO2 SERPL-SCNC: 30 MMOL/L (ref 21–32)
CREAT SERPL-MCNC: 1.47 MG/DL (ref 0.5–1.3)
EGFRCR SERPLBLD CKD-EPI 2021: 54 ML/MIN/1.73M*2
ERYTHROCYTE [DISTWIDTH] IN BLOOD BY AUTOMATED COUNT: 16.5 % (ref 11.5–14.5)
GLUCOSE SERPL-MCNC: 97 MG/DL (ref 74–99)
HCT VFR BLD AUTO: 45.1 % (ref 41–52)
HGB BLD-MCNC: 14.8 G/DL (ref 13.5–17.5)
MCH RBC QN AUTO: 31.7 PG (ref 26–34)
MCHC RBC AUTO-ENTMCNC: 32.8 G/DL (ref 32–36)
MCV RBC AUTO: 97 FL (ref 80–100)
NRBC BLD-RTO: 0 /100 WBCS (ref 0–0)
PLATELET # BLD AUTO: 183 X10*3/UL (ref 150–450)
POTASSIUM SERPL-SCNC: 4 MMOL/L (ref 3.5–5.3)
RBC # BLD AUTO: 4.67 X10*6/UL (ref 4.5–5.9)
SODIUM SERPL-SCNC: 139 MMOL/L (ref 136–145)
WBC # BLD AUTO: 7.6 X10*3/UL (ref 4.4–11.3)

## 2024-03-20 PROCEDURE — 85613 RUSSELL VIPER VENOM DILUTED: CPT

## 2024-03-20 PROCEDURE — 85027 COMPLETE CBC AUTOMATED: CPT

## 2024-03-20 PROCEDURE — 93306 TTE W/DOPPLER COMPLETE: CPT | Performed by: STUDENT IN AN ORGANIZED HEALTH CARE EDUCATION/TRAINING PROGRAM

## 2024-03-20 PROCEDURE — 85306 CLOT INHIBIT PROT S FREE: CPT

## 2024-03-20 PROCEDURE — 93306 TTE W/DOPPLER COMPLETE: CPT

## 2024-03-20 PROCEDURE — 85730 THROMBOPLASTIN TIME PARTIAL: CPT

## 2024-03-20 PROCEDURE — 36415 COLL VENOUS BLD VENIPUNCTURE: CPT

## 2024-03-20 PROCEDURE — 80048 BASIC METABOLIC PNL TOTAL CA: CPT

## 2024-03-20 PROCEDURE — 85302 CLOT INHIBIT PROT C ANTIGEN: CPT

## 2024-03-21 LAB
AORTIC VALVE MEAN GRADIENT: 3 MMHG
AORTIC VALVE PEAK VELOCITY: 1.24 M/S
AV PEAK GRADIENT: 6.2 MMHG
AVA (PEAK VEL): 2.99 CM2
AVA (VTI): 3.19 CM2
DRVVT SCREEN TO CONFIRM RATIO: 1.39 RATIO
DRVVT/DRVVT CFM NRMLZD PPP-RTO: 1.66 RATIO
DRVVT/DRVVT CFM P DOAC NEUT NORM PPP-RTO: 0.84 RATIO
EJECTION FRACTION APICAL 4 CHAMBER: 53
EJECTION FRACTION: 53 %
LA 2 SCREEN W REFLEX-IMP: NORMAL
LEFT ATRIUM VOLUME AREA LENGTH INDEX BSA: 33.4 ML/M2
LEFT VENTRICLE INTERNAL DIMENSION DIASTOLE: 5.08 CM (ref 3.5–6)
LEFT VENTRICULAR OUTFLOW TRACT DIAMETER: 2.1 CM
MITRAL VALVE E/A RATIO: 0.62
MITRAL VALVE E/E' RATIO: 5.69
NORMALIZED SCT PPP-RTO: 0.67 RATIO
RIGHT VENTRICLE FREE WALL PEAK S': 15 CM/S
SILICA CLOTTING TIME CONFIRMATION: 1.32 RATIO
SILICA CLOTTING TIME SCREEN: 0.89 RATIO
TRICUSPID ANNULAR PLANE SYSTOLIC EXCURSION: 2.5 CM

## 2024-03-21 NOTE — PROGRESS NOTES
Patient: Fred Menendez    [id number]  : 1960 -- AGE 63 y.o.    Provider: Loren Romero CNP     Location [facility]   Service Date: 3/27/2024            [facility]  New Visit Note    Virtual or Telephone Consent  A telephone visit (audio only) between the patient (at the originating site) and the provider (at the distant site) was utilized to provide this telehealth service.   Verbal consent was requested and obtained from Fred Menendez on this date, 24 for a telehealth visit.       HISTORY OF PRESENT ILLNESS     The patient's referring provider is: No ref. provider found    HISTORY OF PRESENT ILLNESS   Fred Menendez is a 63 y.o. male who presents to a [facility] for a pre-bronchoscopy evaluation with concerns of pulmonary cavitary lesion. . I have independently interviewed and examined the patient in the office and reviewed available records.    Current History    This patient was admitted in 2024 with 1 week of dyspnea on exertion, productive cough and acute hypoxia. CT angiogram revealed a left-sided cavitary pneumonia and bilateral pulmonary emboli without CT evidence of right heart strain, treated with heparin and antibiotics.  He is now on Eliquis.  Due to continued presence of cavitary lesion he was referred for a bronchoscopy.     On today's visit, the patient reports feeling fine since admission. Occ dry cough and at night, more intermittent cough. Denies clear, green or blood streaks.  No night cough. No hemoptysis. No fever or shivering chills.Denies wheezing. No longer requiring oxygen. Denies SOB at rest. At baseline,  has dyspnea on exertion, improving, previously sat for 15 min to recover. 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 Currently sits for most of the day, works inside the house, but does not carry loads and do strenuous exercise. They are active in  her everyday life and carries loads and does strenuous exercise. He is only troubled by breathlessness except on strenuous exercise (mMRC 0).  He does stationary bike at home that he is able to tolerate 15min of exercise. Denies orthopnea, pnd, or gilberto.  Has lost X 25 pounds in the last X 3 months.   Has no runny nose, or a tingling sensation in the back of his throat. Denies chest pain or heartburn.     Previous pulmonary history:  no history of recurrent infections, or lung disease as a child.  No previous lung hx, never on oxygen or inhaler therapy.     Inhalers/nebulized medications: none     Hospitalization History: hospitalized with pneumonia recently     Sleep history:  Complains of snoring, but denies apnea, feeling tired during the day, and taking naps during the day.       ALLERGIES AND MEDICATIONS     ALLERGIES  No Known Allergies    MEDICATIONS  Current Outpatient Medications   Medication Sig Dispense Refill    amLODIPine (Norvasc) 10 mg tablet Take 1 tablet (10 mg) by mouth once daily. (Patient taking differently: Take 1 tablet (10 mg) by mouth once daily in the morning.) 90 tablet 3    apixaban (Eliquis) 5 mg (74 tabs) tablet Take 1 tablet (5 mg) by mouth 2 times a day. 180 tablet 3    atorvastatin (Lipitor) 40 mg tablet Take 1 tablet (40 mg) by mouth once daily at bedtime. 90 tablet 3    carvedilol (Coreg) 25 mg tablet Take 1 tablet (25 mg) by mouth 2 times a day. 180 tablet 3    chlorthalidone (Hygroton) 25 mg tablet Take 1 tablet (25 mg) by mouth once daily. (Patient taking differently: Take 1 tablet (25 mg) by mouth once daily in the morning.) 90 tablet 3    doxazosin (Cardura) 4 mg tablet Take 1 tablet (4 mg) by mouth once daily. (Patient taking differently: Take 1 tablet (4 mg) by mouth once daily in the morning.) 90 tablet 3    lisinopril 20 mg tablet Take 2 tablets (40 mg) by mouth once daily. 180 tablet 3    nicotine (Nicoderm CQ) 14 mg/24 hr patch Place 1 patch over 24 hours on the skin once  every 24 hours for 28 days. 28 patch 0     No current facility-administered medications for this visit.         PAST HISTORY     PAST MEDICAL HISTORY  He  has a past medical history of Male infertility, unspecified, Personal history of diseases of the blood and blood-forming organs and certain disorders involving the immune mechanism (07/09/2015), Personal history of other diseases of the nervous system and sense organs (02/28/2020), Personal history of other infectious and parasitic diseases, Personal history of other infectious and parasitic diseases, and Retinal detachment (1986).    PAST SURGICAL HISTORY  Past Surgical History:   Procedure Laterality Date    OTHER SURGICAL HISTORY  07/09/2015    Excisional Lymph Node Biopsy    SPLENECTOMY, TOTAL  07/09/2015    Splenectomy       IMMUNIZATION HISTORY  Immunization History   Administered Date(s) Administered    Pneumococcal polysaccharide vaccine, 23-valent, age 2 years and older (PNEUMOVAX 23) 01/11/2017, 08/21/2020    Tdap vaccine, age 7 year and older (BOOSTRIX, ADACEL) 07/09/2015, 01/11/2017       SOCIAL HISTORY  He  reports that he has been smoking cigarettes. He has a 7.50 pack-year smoking history. He has been exposed to tobacco smoke. He has never used smokeless tobacco. He reports that he does not currently use alcohol. He reports that he does not use drugs. 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  DOES/DOES NOT EC: does not have exposure to birds or exotic animals.    FAMILY HISTORY  Family History   Problem Relation Name Age of Onset    Diabetes Father      Stroke Father      Heart attack Father       DOES/DOES NOT EC: does not have a family history of pulmonary disease.  DOES/DOES NOT EC: does not have a family  history of cancer.  DOES/DOES NOT EC: does not have a family history of autoimmune disorders.    RESULTS/DATA     Pulmonary Function Test Results     No testing done.    Chest Radiograph     XR chest 2 views 01/12/2024    Narrative  STUDY:  Chest Radiographs;  1/12/2024 10:26 AM.  INDICATION:  Shortness of breath and hypoxia.    ORDERING CLINICIAN:  JOSUE ANDERSON    CARDIOMEDIASTINAL SILHOUETTE:  Cardiomediastinal silhouette is normal in size and configuration.    LUNGS:  Chronic interstitial lung changes are identified.  Air-fluid level is  seen in the left upper lobe measuring 3.4 x 2.9 cm.  There is  surrounding focal airspace disease in the left upper lobe.  Findings  are highly concerning for acute pneumonia with possible abscess  formation or necrosis.  Vascular calcifications are seen.  There is  also left lower lobe atelectasis.    ABDOMEN:  No remarkable upper abdominal findings.    BONES:  No acute osseous changes.    Impression  Left upper lobe consolidation is seen with a central air-fluid level  that could represent abscess formation.  Dedicated CT of the chest is  recommended.  Signed by Shelia Oakes MD      Chest CT Scan       CT chest: 2/14/2024    FINDINGS:  LUNGS and AIRWAYS:  A 6.7 x 4 x 3.3 cm consolidative opacity at the anterior segment of  the left upper lobe abutting the minor fissure and pleura anteriorly  is more condensed and opacified compared to an 8.8 cm area of opacity  on the previous examination. There is a central approximate 1.7 cm  cavitation, also decreased from approximately 3.3 cm previously. This  would be consistent with consolidating pneumonia, but development of  a lung abscess is possible. This again extends to the hilum.  Continued follow-up recommended. The lungs otherwise are clear.          MEDIASTINUM and PERRY, LOWER NECK AND AXILLA:  The visualized thyroid gland is within normal limits.      No evidence of thoracic lymphadenopathy by CT criteria.      HEART and  VESSELS:  The thoracic aorta is of normal course and caliber without  significant atherosclerotic calcification .      Main pulmonary artery and its branches are normal in caliber.      There is extensive coronary artery atherosclerotic calcification.      The cardiac chambers are not enlarged.      UPPER ABDOMEN:  The visualized subdiaphragmatic structures demonstrate no remarkable  findings.      CHEST WALL, OSSEOUS STRUCTURES AND OTHER FINDINGS:  Moderate kyphosis and spondylosis, as well as several mild  compression deformity similar to the prior exam. Otherwise no  suspicious osseous lesions.      IMPRESSION:  1.  Smaller but denser opacity of the anterior segment of the left  upper lung, indicating consolidating pneumonia, or possibly with  developing abscess. Continued follow-up is recommended.    ------------------------------------------------------------------    CT angio chest for pulmonary embolism 01/12/2024    Narrative  STUDY:  CT Angiogram of the Chest; 1/12/2024 11:57 AM  INDICATION:  Mass in left upper lung, hypoxia.  COMPARISON:  XR chest 1/12/2024.    JOSUE ANDERSON  TECHNIQUE:  CTA of the chest was performed with intravenous contrast.  Images are reviewed and processed at a workstation according to the CT  angiogram protocol with 3-D and/or MIP post processing imaging  generated.  Omnipaque 350--77 mL was administered intravenously.  Automated mA/kV exposure control was utilized and patient examination  was performed in strict accordance with principles of ALARA.  FINDINGS:  Large filling defects noted within the main pulmonary artery  bilaterally extending into first the secondary branches of the upper  lobes and right lower lobe consistent with acute pulmonary embolism.  The thoracic aorta is normal in course and caliber without dissection  or aneurysm.  The heart is normal in size with minimal pericardial effusion.  No  evidence of right heart strain.  Thoracic lymph nodes are  not  enlarged.  Minimal left pleural effusion.  The airways are patent.  3 cm left upper lobe thin-walled cavity noted containing small amount  of fluid associated with adjacent airspace opacification of the lung.  Mild centrilobular pulmonary emphysema.  Upper abdomen demonstrates no acute pathology.  3.5 cm cyst noted  within hepatic segment 4B as well as an additional 2 cm cyst within  hepatic segment 5.  Mild thickening of both adrenal gland suggestive  of hypertrophic change.  There are no acute fractures.  No suspicious bony lesions.    Impression  Extensive acute bilateral pulmonary embolism without evidence of right  heart strain. 3 cm left upper lobe thin-walled cavity with adjacent  airspace opacification of the lung consistent pneumonia. Pulmonary  emphysema. Minimal left pleural effusion. No evidence of right heart  strain.      Echocardiogram     Study Date:        3/20/2024                   PHYSICIAN INTERPRETATION:  Left Ventricle: The left ventricular systolic function is low normal, with an estimated ejection fraction of 50-55%. There are no regional wall motion abnormalities. The left ventricular cavity size is normal. Spectral Doppler shows an impaired relaxation pattern of left ventricular diastolic filling.  Left Atrium: The left atrium is normal in size.  Right Ventricle: The right ventricle is normal in size. There is normal right ventricular global systolic function.  Right Atrium: The right atrium is normal in size.  Aortic Valve: The aortic valve is trileaflet. There is minimal aortic valve cusp calcification. There is no evidence of aortic valve stenosis.  The aortic valve dimensionless index is 0.92. There is no evidence of aortic valve regurgitation. The peak instantaneous gradient of the aortic valve is 6.2 mmHg. The mean gradient of the aortic valve is 3.0 mmHg.  Mitral Valve: The mitral valve is normal in structure. There is trace mitral valve regurgitation.  Tricuspid Valve: The  tricuspid valve is structurally normal. There is trace to mild tricuspid regurgitation. The right ventricular systolic pressure is unable to be estimated.  Pulmonic Valve: The pulmonic valve is not well visualized. There is physiologic pulmonic valve regurgitation.  Pericardium: There is no pericardial effusion noted.  Aorta: The aortic root is normal. There is upper limits of normal dilatation of the ascending aorta. The aortic root is at the upper limits of normal size.  Systemic Veins: The inferior vena cava appears to be of normal size. There is IVC inspiratory collapse greater than 50%.  In comparison to the previous echocardiogram(s): There are no prior studies on this patient for comparison purposes.        CONCLUSIONS:   1. Left ventricular systolic function is low normal with a 50-55% estimated ejection fraction.   2. Spectral Doppler shows an impaired relaxation pattern of left ventricular diastolic filling.          REVIEW OF SYSTEMS     REVIEW OF SYSTEMS  Review of Systems   Constitutional:  Positive for appetite change.   All other systems reviewed and are negative.        PHYSICAL EXAM     VITAL SIGNS: There were no vitals taken for this visit.     CURRENT WEIGHT: [unfilled]  BMI: [unfilled]  PREVIOUS WEIGHTS:  Wt Readings from Last 3 Encounters:   02/19/24 93 kg (205 lb 0.4 oz)   01/26/24 88 kg (194 lb)   01/13/24 88.4 kg (194 lb 14.2 oz)       Physical Exam  Constitutional:       Appearance: Normal appearance.   Pulmonary:      Effort: Pulmonary effort is normal.   Neurological:      General: No focal deficit present.      Mental Status: He is alert and oriented to person, place, and time.   Psychiatric:         Mood and Affect: Mood normal.         Behavior: Behavior normal.         Thought Content: Thought content normal.         Judgment: Judgment normal.         ASSESSMENT/PLAN     Mr. Menednez is a 63 y.o. male and  has a past medical history of Male infertility, unspecified, Personal history of  diseases of the blood and blood-forming organs and certain disorders involving the immune mechanism (07/09/2015), Personal history of other diseases of the nervous system and sense organs (02/28/2020), Personal history of other infectious and parasitic diseases, Personal history of other infectious and parasitic diseases, and Retinal detachment (1986). He was referred to the [facility] for evaluation of left upper lobe abscess     Problem List and Orders      Assessment and Plan / Recommendations:  Problem List Items Addressed This Visit    None     Patient's visit was converted to a virtual visit.    1. 6.7 x 4 x 3.3 cm left upper lobe abscess   - Plan for bronchoscopy with biopsy   - Lab work prior to procedure 3/20/2024 hgb 14.8, plt 183, bicarb 30mmol, creatinine 1.47  - anticoagulation - eliquis - stopping on 3/31 for 2 full days      Tobacco abuse  Tapering his consumption of cigarettes. He feels relief of cravings with patch. Refill sent for nicotine patch.     I explained the procedure to the patient. We discussed that the bronchoscopy will be performed by a member of the Interventional Pulmonary Team; depending on scheduling and provider availability. We also discussed that the IP providers function as a team and not infrequently may have to fill in for one another if there are emergent issues that need attention at the same time. The patient / family expressed understanding and agreed to proceed. All questions were answered.     Patient's visit was converted to a virtual visit. Spoke with the patient via phone for 24 minutes.    Prep: 10 minutes  Phone: 14 minutes  Total: 24 minutes        Thank you for visiting the Pulmonary clinic today!         Loren Romero CNP  My office number is [phone] -     Best way to get a hold of me is to call my office --> Please do not send me follow my health messages  Any test results will be discussed at next visit -- please  make sure to make a follow up appt after testing.

## 2024-03-22 LAB — PROT C AG ACT/NOR PPP IA: 76 % (ref 63–153)

## 2024-03-26 LAB — PROT S FREE AG ACT/NOR PPP IA: 113 % (ref 70–130)

## 2024-03-27 ENCOUNTER — TELEMEDICINE (OUTPATIENT)
Dept: PULMONOLOGY | Facility: CLINIC | Age: 64
End: 2024-03-27
Payer: COMMERCIAL

## 2024-03-27 ENCOUNTER — APPOINTMENT (OUTPATIENT)
Dept: PULMONOLOGY | Facility: CLINIC | Age: 64
End: 2024-03-27
Payer: COMMERCIAL

## 2024-03-27 DIAGNOSIS — Z72.0 TOBACCO ABUSE: ICD-10-CM

## 2024-03-27 DIAGNOSIS — J98.4 CAVITATING MASS IN LEFT UPPER LUNG LOBE: Primary | ICD-10-CM

## 2024-03-27 DIAGNOSIS — Z01.811 PREOP PULMONARY/RESPIRATORY EXAM: ICD-10-CM

## 2024-03-27 DIAGNOSIS — J18.9 PNEUMONIA OF LEFT UPPER LOBE DUE TO INFECTIOUS ORGANISM: ICD-10-CM

## 2024-03-27 PROCEDURE — 99212 OFFICE O/P EST SF 10 MIN: CPT | Performed by: NURSE PRACTITIONER

## 2024-03-27 RX ORDER — IBUPROFEN 200 MG
1 TABLET ORAL EVERY 24 HOURS
Qty: 28 PATCH | Refills: 2 | Status: SHIPPED | OUTPATIENT
Start: 2024-03-27 | End: 2024-04-24

## 2024-03-27 ASSESSMENT — COLUMBIA-SUICIDE SEVERITY RATING SCALE - C-SSRS
2. HAVE YOU ACTUALLY HAD ANY THOUGHTS OF KILLING YOURSELF?: NO
1. IN THE PAST MONTH, HAVE YOU WISHED YOU WERE DEAD OR WISHED YOU COULD GO TO SLEEP AND NOT WAKE UP?: NO
6. HAVE YOU EVER DONE ANYTHING, STARTED TO DO ANYTHING, OR PREPARED TO DO ANYTHING TO END YOUR LIFE?: NO

## 2024-03-27 ASSESSMENT — ENCOUNTER SYMPTOMS
LOSS OF SENSATION IN FEET: 0
APPETITE CHANGE: 1
OCCASIONAL FEELINGS OF UNSTEADINESS: 0
DEPRESSION: 0

## 2024-04-02 ENCOUNTER — HOSPITAL ENCOUNTER (OUTPATIENT)
Dept: RADIOLOGY | Facility: HOSPITAL | Age: 64
Discharge: HOME | End: 2024-04-02
Payer: COMMERCIAL

## 2024-04-02 DIAGNOSIS — I26.99 PE (PULMONARY THROMBOEMBOLISM) (MULTI): ICD-10-CM

## 2024-04-02 DIAGNOSIS — J98.4 CAVITATING MASS IN LEFT UPPER LUNG LOBE: ICD-10-CM

## 2024-04-02 PROCEDURE — 71250 CT THORAX DX C-: CPT | Performed by: RADIOLOGY

## 2024-04-02 PROCEDURE — 71250 CT THORAX DX C-: CPT

## 2024-04-03 ENCOUNTER — HOSPITAL ENCOUNTER (OUTPATIENT)
Dept: RADIOLOGY | Facility: HOSPITAL | Age: 64
Setting detail: OUTPATIENT SURGERY
Discharge: HOME | End: 2024-04-03
Payer: COMMERCIAL

## 2024-04-03 ENCOUNTER — HOSPITAL ENCOUNTER (OUTPATIENT)
Dept: GASTROENTEROLOGY | Facility: HOSPITAL | Age: 64
Setting detail: OUTPATIENT SURGERY
Discharge: HOME | End: 2024-04-03
Payer: COMMERCIAL

## 2024-04-03 ENCOUNTER — ANESTHESIA EVENT (OUTPATIENT)
Dept: GASTROENTEROLOGY | Facility: HOSPITAL | Age: 64
End: 2024-04-03
Payer: COMMERCIAL

## 2024-04-03 ENCOUNTER — ANESTHESIA (OUTPATIENT)
Dept: GASTROENTEROLOGY | Facility: HOSPITAL | Age: 64
End: 2024-04-03
Payer: COMMERCIAL

## 2024-04-03 VITALS
HEART RATE: 51 BPM | RESPIRATION RATE: 13 BRPM | TEMPERATURE: 97.5 F | HEIGHT: 68 IN | OXYGEN SATURATION: 97 % | DIASTOLIC BLOOD PRESSURE: 66 MMHG | SYSTOLIC BLOOD PRESSURE: 104 MMHG | BODY MASS INDEX: 31.24 KG/M2 | WEIGHT: 206.13 LBS

## 2024-04-03 DIAGNOSIS — R91.8 OTHER NONSPECIFIC ABNORMAL FINDING OF LUNG FIELD: ICD-10-CM

## 2024-04-03 DIAGNOSIS — I26.99 PE (PULMONARY THROMBOEMBOLISM) (MULTI): ICD-10-CM

## 2024-04-03 DIAGNOSIS — J98.4 CAVITATING MASS IN LEFT UPPER LUNG LOBE: ICD-10-CM

## 2024-04-03 PROCEDURE — 7100000002 HC RECOVERY ROOM TIME - EACH INCREMENTAL 1 MINUTE

## 2024-04-03 PROCEDURE — A31628 PR BRONCHOSCOPY,TRANSBRONCH BIOPSY: Performed by: ANESTHESIOLOGY

## 2024-04-03 PROCEDURE — 71045 X-RAY EXAM CHEST 1 VIEW: CPT | Performed by: RADIOLOGY

## 2024-04-03 PROCEDURE — 2500000004 HC RX 250 GENERAL PHARMACY W/ HCPCS (ALT 636 FOR OP/ED): Performed by: ANESTHESIOLOGY

## 2024-04-03 PROCEDURE — 7100000001 HC RECOVERY ROOM TIME - INITIAL BASE CHARGE

## 2024-04-03 PROCEDURE — 88312 SPECIAL STAINS GROUP 1: CPT | Performed by: STUDENT IN AN ORGANIZED HEALTH CARE EDUCATION/TRAINING PROGRAM

## 2024-04-03 PROCEDURE — 7100000010 HC PHASE TWO TIME - EACH INCREMENTAL 1 MINUTE

## 2024-04-03 PROCEDURE — 88172 CYTP DX EVAL FNA 1ST EA SITE: CPT | Performed by: PATHOLOGY

## 2024-04-03 PROCEDURE — 88173 CYTOPATH EVAL FNA REPORT: CPT | Mod: TC | Performed by: INTERNAL MEDICINE

## 2024-04-03 PROCEDURE — 87102 FUNGUS ISOLATION CULTURE: CPT | Mod: AHULAB | Performed by: INTERNAL MEDICINE

## 2024-04-03 PROCEDURE — 31628 BRONCHOSCOPY/LUNG BX EACH: CPT | Performed by: INTERNAL MEDICINE

## 2024-04-03 PROCEDURE — 2500000005 HC RX 250 GENERAL PHARMACY W/O HCPCS: Performed by: ANESTHESIOLOGIST ASSISTANT

## 2024-04-03 PROCEDURE — 88305 TISSUE EXAM BY PATHOLOGIST: CPT | Performed by: PATHOLOGY

## 2024-04-03 PROCEDURE — 3700000001 HC GENERAL ANESTHESIA TIME - INITIAL BASE CHARGE

## 2024-04-03 PROCEDURE — 88173 CYTOPATH EVAL FNA REPORT: CPT | Performed by: PATHOLOGY

## 2024-04-03 PROCEDURE — 7100000009 HC PHASE TWO TIME - INITIAL BASE CHARGE

## 2024-04-03 PROCEDURE — 3700000002 HC GENERAL ANESTHESIA TIME - EACH INCREMENTAL 1 MINUTE

## 2024-04-03 PROCEDURE — 2720000007 HC OR 272 NO HCPCS

## 2024-04-03 PROCEDURE — 2500000004 HC RX 250 GENERAL PHARMACY W/ HCPCS (ALT 636 FOR OP/ED): Performed by: ANESTHESIOLOGIST ASSISTANT

## 2024-04-03 PROCEDURE — 76000 FLUOROSCOPY <1 HR PHYS/QHP: CPT

## 2024-04-03 PROCEDURE — 87070 CULTURE OTHR SPECIMN AEROBIC: CPT | Mod: AHULAB | Performed by: INTERNAL MEDICINE

## 2024-04-03 PROCEDURE — 71045 X-RAY EXAM CHEST 1 VIEW: CPT

## 2024-04-03 PROCEDURE — 87015 SPECIMEN INFECT AGNT CONCNTJ: CPT | Mod: AHULAB | Performed by: INTERNAL MEDICINE

## 2024-04-03 PROCEDURE — A31628 PR BRONCHOSCOPY,TRANSBRONCH BIOPSY: Performed by: ANESTHESIOLOGIST ASSISTANT

## 2024-04-03 PROCEDURE — 88312 SPECIAL STAINS GROUP 1: CPT | Mod: TC,AHULAB | Performed by: INTERNAL MEDICINE

## 2024-04-03 PROCEDURE — 2500000005 HC RX 250 GENERAL PHARMACY W/O HCPCS: Performed by: ANESTHESIOLOGY

## 2024-04-03 RX ORDER — ROCURONIUM BROMIDE 10 MG/ML
INJECTION, SOLUTION INTRAVENOUS AS NEEDED
Status: DISCONTINUED | OUTPATIENT
Start: 2024-04-03 | End: 2024-04-03

## 2024-04-03 RX ORDER — NORETHINDRONE AND ETHINYL ESTRADIOL 0.5-0.035
KIT ORAL AS NEEDED
Status: DISCONTINUED | OUTPATIENT
Start: 2024-04-03 | End: 2024-04-03

## 2024-04-03 RX ORDER — ONDANSETRON HYDROCHLORIDE 2 MG/ML
4 INJECTION, SOLUTION INTRAVENOUS ONCE AS NEEDED
Status: DISCONTINUED | OUTPATIENT
Start: 2024-04-03 | End: 2024-04-04 | Stop reason: HOSPADM

## 2024-04-03 RX ORDER — MIDAZOLAM HYDROCHLORIDE 1 MG/ML
INJECTION INTRAMUSCULAR; INTRAVENOUS AS NEEDED
Status: DISCONTINUED | OUTPATIENT
Start: 2024-04-03 | End: 2024-04-03

## 2024-04-03 RX ORDER — PROPOFOL 10 MG/ML
INJECTION, EMULSION INTRAVENOUS CONTINUOUS PRN
Status: DISCONTINUED | OUTPATIENT
Start: 2024-04-03 | End: 2024-04-03

## 2024-04-03 RX ORDER — LIDOCAINE HYDROCHLORIDE 20 MG/ML
INJECTION, SOLUTION INFILTRATION; PERINEURAL AS NEEDED
Status: DISCONTINUED | OUTPATIENT
Start: 2024-04-03 | End: 2024-04-03

## 2024-04-03 RX ORDER — SODIUM CHLORIDE, SODIUM LACTATE, POTASSIUM CHLORIDE, CALCIUM CHLORIDE 600; 310; 30; 20 MG/100ML; MG/100ML; MG/100ML; MG/100ML
INJECTION, SOLUTION INTRAVENOUS CONTINUOUS PRN
Status: DISCONTINUED | OUTPATIENT
Start: 2024-04-03 | End: 2024-04-03

## 2024-04-03 RX ORDER — FENTANYL CITRATE 50 UG/ML
INJECTION, SOLUTION INTRAMUSCULAR; INTRAVENOUS AS NEEDED
Status: DISCONTINUED | OUTPATIENT
Start: 2024-04-03 | End: 2024-04-03

## 2024-04-03 RX ORDER — ACETAMINOPHEN 325 MG/1
650 TABLET ORAL EVERY 4 HOURS PRN
Status: DISCONTINUED | OUTPATIENT
Start: 2024-04-03 | End: 2024-04-04 | Stop reason: HOSPADM

## 2024-04-03 RX ORDER — ALBUTEROL SULFATE 0.83 MG/ML
2.5 SOLUTION RESPIRATORY (INHALATION) ONCE AS NEEDED
Status: DISCONTINUED | OUTPATIENT
Start: 2024-04-03 | End: 2024-04-04 | Stop reason: HOSPADM

## 2024-04-03 RX ORDER — OXYCODONE HYDROCHLORIDE 5 MG/1
5 TABLET ORAL EVERY 4 HOURS PRN
Status: DISCONTINUED | OUTPATIENT
Start: 2024-04-03 | End: 2024-04-04 | Stop reason: HOSPADM

## 2024-04-03 RX ORDER — PHENYLEPHRINE HCL IN 0.9% NACL 0.4MG/10ML
SYRINGE (ML) INTRAVENOUS AS NEEDED
Status: DISCONTINUED | OUTPATIENT
Start: 2024-04-03 | End: 2024-04-03

## 2024-04-03 RX ORDER — SODIUM CHLORIDE, SODIUM LACTATE, POTASSIUM CHLORIDE, CALCIUM CHLORIDE 600; 310; 30; 20 MG/100ML; MG/100ML; MG/100ML; MG/100ML
100 INJECTION, SOLUTION INTRAVENOUS CONTINUOUS
Status: DISCONTINUED | OUTPATIENT
Start: 2024-04-03 | End: 2024-04-04 | Stop reason: HOSPADM

## 2024-04-03 RX ORDER — PROPOFOL 10 MG/ML
INJECTION, EMULSION INTRAVENOUS AS NEEDED
Status: DISCONTINUED | OUTPATIENT
Start: 2024-04-03 | End: 2024-04-03

## 2024-04-03 RX ADMIN — Medication 100 MCG: at 10:51

## 2024-04-03 RX ADMIN — EPHEDRINE SULFATE 10 MG: 50 INJECTION INTRAVENOUS at 10:46

## 2024-04-03 RX ADMIN — SODIUM CHLORIDE, POTASSIUM CHLORIDE, SODIUM LACTATE AND CALCIUM CHLORIDE 100 ML/HR: 600; 310; 30; 20 INJECTION, SOLUTION INTRAVENOUS at 11:30

## 2024-04-03 RX ADMIN — EPHEDRINE SULFATE 15 MG: 50 INJECTION INTRAVENOUS at 10:51

## 2024-04-03 RX ADMIN — SUGAMMADEX 200 MG: 100 INJECTION, SOLUTION INTRAVENOUS at 11:20

## 2024-04-03 RX ADMIN — Medication 200 MCG: at 11:12

## 2024-04-03 RX ADMIN — PROPOFOL 50 MCG/KG/MIN: 10 INJECTION, EMULSION INTRAVENOUS at 10:30

## 2024-04-03 RX ADMIN — Medication 200 MCG: at 10:46

## 2024-04-03 RX ADMIN — Medication 300 MCG: at 10:37

## 2024-04-03 RX ADMIN — LIDOCAINE HYDROCHLORIDE 50 MG: 20 INJECTION, SOLUTION INFILTRATION; PERINEURAL at 10:22

## 2024-04-03 RX ADMIN — FENTANYL CITRATE 25 MCG: 50 INJECTION, SOLUTION INTRAMUSCULAR; INTRAVENOUS at 10:22

## 2024-04-03 RX ADMIN — PROPOFOL 140 MG: 10 INJECTION, EMULSION INTRAVENOUS at 10:22

## 2024-04-03 RX ADMIN — GLYCOPYRROLATE 0.2 MG: 0.2 INJECTION, SOLUTION INTRAMUSCULAR; INTRAVITREAL at 10:39

## 2024-04-03 RX ADMIN — Medication 2 L/MIN: at 11:45

## 2024-04-03 RX ADMIN — SODIUM CHLORIDE, POTASSIUM CHLORIDE, SODIUM LACTATE AND CALCIUM CHLORIDE: 600; 310; 30; 20 INJECTION, SOLUTION INTRAVENOUS at 10:07

## 2024-04-03 RX ADMIN — ROCURONIUM BROMIDE 60 MG: 10 INJECTION, SOLUTION INTRAVENOUS at 10:22

## 2024-04-03 RX ADMIN — Medication 200 MCG: at 11:03

## 2024-04-03 RX ADMIN — MIDAZOLAM HYDROCHLORIDE 1 MG: 1 INJECTION INTRAMUSCULAR; INTRAVENOUS at 10:16

## 2024-04-03 RX ADMIN — MIDAZOLAM HYDROCHLORIDE 1 MG: 1 INJECTION INTRAMUSCULAR; INTRAVENOUS at 10:19

## 2024-04-03 RX ADMIN — EPHEDRINE SULFATE 10 MG: 50 INJECTION INTRAVENOUS at 11:12

## 2024-04-03 RX ADMIN — EPHEDRINE SULFATE 10 MG: 50 INJECTION INTRAVENOUS at 11:20

## 2024-04-03 RX ADMIN — Medication 200 MCG: at 10:32

## 2024-04-03 RX ADMIN — EPHEDRINE SULFATE 5 MG: 50 INJECTION INTRAVENOUS at 11:02

## 2024-04-03 ASSESSMENT — PAIN - FUNCTIONAL ASSESSMENT
PAIN_FUNCTIONAL_ASSESSMENT: 0-10

## 2024-04-03 ASSESSMENT — COLUMBIA-SUICIDE SEVERITY RATING SCALE - C-SSRS
1. IN THE PAST MONTH, HAVE YOU WISHED YOU WERE DEAD OR WISHED YOU COULD GO TO SLEEP AND NOT WAKE UP?: NO
6. HAVE YOU EVER DONE ANYTHING, STARTED TO DO ANYTHING, OR PREPARED TO DO ANYTHING TO END YOUR LIFE?: NO
2. HAVE YOU ACTUALLY HAD ANY THOUGHTS OF KILLING YOURSELF?: NO

## 2024-04-03 ASSESSMENT — PAIN SCALES - GENERAL
PAINLEVEL_OUTOF10: 0 - NO PAIN

## 2024-04-03 NOTE — DISCHARGE INSTRUCTIONS
The anesthetics, sedatives or narcotics which were given to you today will be acting in your body for the next 24 hours, so you might feel a little sleepy or groggy. This feeling should slowly wear off.   Carefully read and follow the instructions below:   You received sedation today.   Do not drive or operate machinery or power tools of any kind.   No alcoholic beverages today, not even beer or wine.   No over the counter medications that contain alcohol or may cause drowsiness.   Do not make important decisions or sign legal documents.     Do not use Aspirin containing products or non-steroidal medications for the next 24 hours.  (Examples of these types of medications include: Advil, Aleve, Ecotrin, Ibuprofen, Motrin or Naprosyn.  This list is not all-inclusive.  Check with your physician or pharmacist before resuming these medications.)  Tylenol, cough medicine, cough drops or throat lozenges may be used when you are allowed to resume eating and drinking.     Call your physician if any of these symptoms occur:   High fever over 101 degrees or chills (a low grade fever is common for 24 hours)   Rash or hives   Persistent nausea or vomiting   Inability to urinate within 8 hours after the procedure  Go directly to the emergency room if you notice any of the following:   Shortness of breath   Chest pain  Coughing up large amounts of bright red blood greater than a teaspoonful of blood clots (about a teaspoonful for the next 24-48 hours is normal, especially if you had a biopsy)  Resume all normal medications unless directed otherwise by your doctor.    Please restart taking the Apixaban as prescribed starting 4/4/24     Follow up with your referring physician as previously scheduled.    If you experience any problems or have any questions following discharge, please call:   Before 5 pm: [phone]   After 5pm and on weekends: [phone] / [phone] and ask for the Pulmonary Fellow on-call (Pager Number:  [phone])

## 2024-04-03 NOTE — ANESTHESIA POSTPROCEDURE EVALUATION
Patient: Fred Menendez    Procedure Summary       Date: 04/03/24 Room / Location: [facility]    Anesthesia Start: 1007 Anesthesia Stop: 1136    Procedure: BRONCHOSCOPY Diagnosis:       Cavitating mass in left upper lung lobe      PE (pulmonary thromboembolism) (CMS/HCC)    Scheduled Providers: Gian Teran MD; Bonifacio Khanna MD; Allison Rosas RN; Rosangela Harrison MA; Shagufta Gonzalez RN; Hanh Bates RN Responsible Provider: Bonifacio Khanna MD    Anesthesia Type: general ASA Status: 3            Anesthesia Type: general    Vitals Value Taken Time   /66 04/03/24 1314   Temp 36.4 °C (97.5 °F) 04/03/24 1314   Pulse 51 04/03/24 1314   Resp 12 04/03/24 1314   SpO2 94 % 04/03/24 1314       Anesthesia Post Evaluation    Patient location during evaluation: PACU  Patient participation: complete - patient participated  Level of consciousness: awake and alert  Pain management: adequate  Airway patency: patent  Cardiovascular status: acceptable and hemodynamically stable  Respiratory status: acceptable, spontaneous ventilation and nonlabored ventilation  Hydration status: acceptable  Postoperative Nausea and Vomiting: none        There were no known notable events for this encounter.

## 2024-04-03 NOTE — LETTER
Dear, Fred Menendez      3/4/2024                                       INFORMATION FOR YOUR PROCEDURE     INSTRUCTIONS MUST BE FOLLOWED OR YOU RUN THE RISK OF YOUR CASE BEING CANCELED     Information is attached to this e-mail for your upcoming procedure. (Look for the paperclip in your email to access this information)  Lab requisitions are also included as well as procedural instructions.    You are scheduled for your Bronchoscopy on  4/3/24,    with Dr. Gian Teran    Arrival is at   8:00 AM,  for Check In prior to your actual procedure time. This allows us to prepare you for your procedure.  Location:   [place] [address]    Check In:  2nd Floor OR Waiting Room   Approach the  for check in    Patient information is right there if assistance is needed.    NPO (No food or drink) after midnight the night before your procedure. This includes coffee, water, and soda, hard candy, gum or mints.  If taking your morning medications, a small sip of water is allowed to get the medication down.    For your safety, you must have a responsible, adult  accompany you to your procedure.  You will not be permitted to drive yourself home if you have received any type of anesthesia or sedation.    Automated calls about your upcoming scheduled appointments can be quite confusing for patients.    The times may vary depending on what you have scheduled for procedure day. Follow the time/s I have given you  so there is no confusion.        Blood work will need to be done prior to your procedure, preferably at a  Facility.  There are no restrictions for testing. I have attached a requisition for you. Please have the  Blood work before your phone visit with our CNP, Loren Romero on 3/27/24    Last dose of Eliquis will be on Sunday, March 30, 2024.  We have reached out to your provider for permission to hold the medication.    4/2/24 4:45 PM    CT Scan   [facility]  [id number] [place] [address]      Our Nurse Practitioner, Loren Romero CNP, will call you on  3/27/24, @ 4:00 PM    This is a phone visit to go over your medical history prior to your procedure, and is a necessary part of your workup.  The patient must be present at this phone visit.    Please be sure to schedule your ECHO prior to this procedure    Please reach out to me with any questions you may have Disha @ [phone] or   Siva @ [phone]    Have a nice day!    Disha Suresh     (Bronchoscopy)  Interventional Pulmonology    MD Gian Byrd MD Sameer Avasarala, MD Catalina Teba, MD Andrew Dunatchik, MD        [facility]  Pulmonary, Critical Care and Sleep Medicine  [address] [place] [address]  P -[phone]  - [phone]  [email or web address]

## 2024-04-03 NOTE — ANESTHESIA PREPROCEDURE EVALUATION
Patient: Fred Menendez    Procedure Information       Date/Time: 04/03/24 0930    Scheduled providers: Gian Teran MD; Bonifacio Khanna MD; Allison Rosas RN; Rosangela Harrison MA; Shagufta Gonzalez, NOHEMY; Hanh Bates, RN    Procedure: BRONCHOSCOPY    Location: [facility]            Relevant Problems   Cardiac   (+) Hyperlipidemia   (+) Hypertension      Pulmonary   (+) PE (pulmonary thromboembolism) (CMS/HCC)   (+) Pneumonia of left upper lobe due to infectious organism      Hematology   (+) PE (pulmonary thromboembolism) (CMS/HCC)      ID   (+) Oral thrush   (+) Pneumonia of left upper lobe due to infectious organism   (+) Tinea cruris   (+) Tinea pedis of both feet       Clinical information reviewed:   Tobacco  Allergies  Meds   Med Hx  Surg Hx   Fam Hx  Soc Hx         Past Medical History:   Diagnosis Date    CKD (chronic kidney disease)     History of non-Hodgkin's lymphoma     1980s    HTN (hypertension)     Hyperlipidemia     Male infertility, unspecified     Sterility male    Pneumonia     Pulmonary embolism (CMS/HCC) 01/12/2024    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      Past Surgical History:   Procedure Laterality Date    CATARACT EXTRACTION, BILATERAL Bilateral     LYMPH NODE BIOPSY      SPLENECTOMY, TOTAL  1986     Social History     Tobacco Use    Smoking status: Some Days     Packs/day: 0.25     Years: 30.00     Additional pack years: 0.00     Total pack years: 7.50     Types: Cigarettes     Passive exposure: Current    Smokeless tobacco: Never   Vaping Use    Vaping Use: Never used   Substance Use Topics    Alcohol use: Yes     Alcohol/week: 7.0 standard drinks of alcohol     Types: 7 Standard drinks or equivalent per week    Drug use: Never      Current Outpatient Medications   Medication Instructions    amLODIPine (NORVASC) 10 mg, oral, Daily    apixaban (ELIQUIS) 5 mg, oral, 2 times daily    atorvastatin (LIPITOR) 40 mg, oral, Nightly    carvedilol  "(COREG) 25 mg, oral, 2 times daily    chlorthalidone (HYGROTON) 25 mg, oral, Daily    doxazosin (CARDURA) 4 mg, oral, Daily    lisinopril 40 mg, oral, Daily    nicotine (Nicoderm CQ) 14 mg/24 hr patch 1 patch, transdermal, Every 24 hours      No Known Allergies     Chemistry    Lab Results   Component Value Date/Time     03/20/2024 1518    K 4.0 03/20/2024 1518     03/20/2024 1518    CO2 30 03/20/2024 1518    BUN 19 03/20/2024 1518    CREATININE 1.47 (H) 03/20/2024 1518    Lab Results   Component Value Date/Time    CALCIUM 9.5 03/20/2024 1518    ALKPHOS 90 02/19/2024 1438    AST 12 02/19/2024 1438    ALT 10 02/19/2024 1438    BILITOT 0.5 02/19/2024 1438          No results found for: \"HGBA1C\"  Lab Results   Component Value Date/Time    WBC 7.6 03/20/2024 1518    HGB 14.8 03/20/2024 1518    HCT 45.1 03/20/2024 1518     03/20/2024 1518     No results found for: \"PROTIME\", \"PTT\", \"INR\"  No results found for: \"ABORH\"  Encounter Date: 01/12/24   ECG 12 lead   Result Value    Ventricular Rate 85    Atrial Rate 85    WI Interval 124    QRS Duration 104    QT Interval 404    QTC Calculation(Bazett) 480    P Axis 94    R Axis -63    T Axis 91    QRS Count 14    Q Onset 205    P Onset 143    P Offset 185    T Offset 407    QTC Fredericia 453    Narrative    Normal sinus rhythm  Pulmonary disease pattern  Left anterior fascicular block  ST & T wave abnormality, consider anterior ischemia  Abnormal ECG  No previous ECGs available  See ED provider note for full interpretation and clinical correlation  Confirmed by Wendi Suarez ([id number]) on 1/18/2024 2:34:20 PM     No results found for this or any previous visit from the past 1095 days.   Echo 3/20/2024:  Left Ventricle: The left ventricular systolic function is low normal, with an estimated ejection fraction of 50-55%. There are no regional wall motion abnormalities. The left ventricular cavity size is normal. Spectral Doppler shows an impaired relaxation " "pattern of left ventricular diastolic filling.  Left Atrium: The left atrium is normal in size.  Right Ventricle: The right ventricle is normal in size. There is normal right ventricular global systolic function.  Right Atrium: The right atrium is normal in size.  Aortic Valve: The aortic valve is trileaflet. There is minimal aortic valve cusp calcification. There is no evidence of aortic valve stenosis.  The aortic valve dimensionless index is 0.92. There is no evidence of aortic valve regurgitation. The peak instantaneous gradient of the aortic valve is 6.2 mmHg. The mean gradient of the aortic valve is 3.0 mmHg.  Mitral Valve: The mitral valve is normal in structure. There is trace mitral valve regurgitation.  Tricuspid Valve: The tricuspid valve is structurally normal. There is trace to mild tricuspid regurgitation. The right ventricular systolic pressure is unable to be estimated.  Pulmonic Valve: The pulmonic valve is not well visualized. There is physiologic pulmonic valve regurgitation.  Pericardium: There is no pericardial effusion noted.  Aorta: The aortic root is normal. There is upper limits of normal dilatation of the ascending aorta. The aortic root is at the upper limits of normal size.  Systemic Veins: The inferior vena cava appears to be of normal size. There is IVC inspiratory collapse greater than 50%.  In comparison to the previous echocardiogram(s): There are no prior studies on this patient for comparison purposes.        CONCLUSIONS:   1. Left ventricular systolic function is low normal with a 50-55% estimated ejection fraction.   2. Spectral Doppler shows an impaired relaxation pattern of left ventricular diastolic filling.    Visit Vitals  /86   Pulse 61   Temp 36.1 °C (97 °F) (Temporal)   Resp 16   Ht 1.727 m (5' 8\")   Wt 93.5 kg (206 lb 2.1 oz)   SpO2 97%   BMI 31.34 kg/m²   Smoking Status Some Days   BSA 2.12 m²     NPO/Void Status  Carbohydrate Drink Given Prior to Surgery? : " N  Date of Last Liquid: 04/02/24  Time of Last Liquid: 1700  Date of Last Solid: 04/02/24  Time of Last Solid: 1700  Last Intake Type: Clear fluids  Time of Last Void: 0700         Physical Exam    Airway  Mallampati: III  TM distance: >3 FB  Neck ROM: full     Cardiovascular   Rhythm: regular  Rate: normal     Dental - normal exam     Pulmonary   Breath sounds clear to auscultation     Abdominal - normal exam              Anesthesia Plan    History of general anesthesia?: yes  History of complications of general anesthesia?: no    ASA 3     general   (General with ETT. Standard ASA monitoring.)  intravenous induction   Postoperative administration of opioids is intended.  Anesthetic plan and risks discussed with patient.    Plan discussed with CAA and CRNA.

## 2024-04-03 NOTE — ANESTHESIA PROCEDURE NOTES
Airway  Date/Time: 4/3/2024 10:24 AM  Urgency: elective    Airway not difficult    Staffing  Performed: SHERIDAN   Authorized by: Bonifacio Khanna MD    Performed by: SHERIDAN Strange  Patient location during procedure: OR    Indications and Patient Condition  Indications for airway management: anesthesia  Spontaneous Ventilation: absent  Sedation level: deep  Preoxygenated: yes  Patient position: sniffing  Mask difficulty assessment: 1 - vent by mask    Final Airway Details  Final airway type: endotracheal airway      Successful airway: ETT  Cuffed: yes   Successful intubation technique: direct laryngoscopy  Endotracheal tube insertion site: oral  Blade: Teresa  Blade size: #4  ETT size (mm): 8.5  Cormack-Lehane Classification: grade I - full view of glottis  Placement verified by: capnometry   Measured from: lips  ETT to lips (cm): 22  Number of attempts at approach: 1

## 2024-04-03 NOTE — H&P
This note represents the pre-procedure assessment of patient presenting for bronchoscopy    Brief History -  This patient was admitted in January 2024 with 1 week of dyspnea on exertion, productive cough and acute hypoxia. CT angiogram revealed a left-sided cavitary pneumonia and bilateral pulmonary emboli without CT evidence of right heart strain, treated with heparin and antibiotics.  He is now on Eliquis.  Due to continued presence of cavitary lesion he was referred for a bronchoscopy.      On today's visit, the patient reports feeling better but not back to baseline. Occ dry cough and at night, more intermittent cough. Denies clear, green or blood streaks.  No night cough. No hemoptysis. No fever or shivering chills. At baseline,  has dyspnea on exertion, improving, previously sat for 15 min to recover.  Has lost X 25 pounds in the last X 3 months.   Denies chest pain or heartburn    Indication - Non-resolving pneumonia  Obtain diagnosis    Medications -   Current Outpatient Medications on File Prior to Encounter   Medication Sig Dispense Refill    amLODIPine (Norvasc) 10 mg tablet Take 1 tablet (10 mg) by mouth once daily. (Patient taking differently: Take 1 tablet (10 mg) by mouth once daily in the morning.) 90 tablet 3    apixaban (Eliquis) 5 mg (74 tabs) tablet Take 1 tablet (5 mg) by mouth 2 times a day. (Patient taking differently: Take 1 tablet (5 mg) by mouth 2 times a day. STOPPED 3/31) 180 tablet 3    atorvastatin (Lipitor) 40 mg tablet Take 1 tablet (40 mg) by mouth once daily at bedtime. 90 tablet 3    carvedilol (Coreg) 25 mg tablet Take 1 tablet (25 mg) by mouth 2 times a day. 180 tablet 3    chlorthalidone (Hygroton) 25 mg tablet Take 1 tablet (25 mg) by mouth once daily. (Patient taking differently: Take 1 tablet (25 mg) by mouth once daily in the morning.) 90 tablet 3    doxazosin (Cardura) 4 mg tablet Take 1 tablet (4 mg) by mouth once daily. (Patient taking differently: Take 1 tablet (4 mg) by  mouth once daily in the morning.) 90 tablet 3    lisinopril 20 mg tablet Take 2 tablets (40 mg) by mouth once daily. 180 tablet 3    nicotine (Nicoderm CQ) 14 mg/24 hr patch Place 1 patch over 24 hours on the skin once every 24 hours for 28 days. 28 patch 2     No current facility-administered medications on file prior to encounter.            Focused Physical Exam   - Emotional and Behavioral  Calm and relaxed  - Level of consciousness  Awake and alert  - Orientation    Oriented x4   - Respiratory   Clear with good air entry  - Cardiovascular  RRR  - Airway Assessment         Mouth opening normal   Neck flexibility normal   Loose teeth none     Oropharyngeal Classification: Mallampati III (soft and hard palate and base of uvula visible)    ASA PS Classification: ASA I ASA 2 - Patient with mild systemic disease with no functional limitations    Anesthesia Plan: This procedure will be performed using standard monitoring procedures in [facility].    Procedure Plan   1. Airway inspection   2. Navigational bronchoscopy to the ASHLEIGH mass   3. EBUS to mediastinal lymph nodes    Informed consent: Pre-procedural evaluation - the importance of tissue sampling and mediastinal lymph node staging was discussed with the patient, and he has agreed to proceed with bronchoscopy bronchoscopy with biopsy and EBUS.  On pre-operative evaluation, there is no contraindication to bronchoscopy under general anesthesia.  The risks, benefits, and alternatives of the procedure were discussed with patient and brother  bronchoscopy with biopsy, EBUS, and guided bronchoscopy bronchoscopy under general anesthesia  Pre-procedural labs reviewed. He is off anticogulation

## 2024-04-03 NOTE — PERIOPERATIVE NURSING NOTE
1130- PHASE I - Patient to PACU bay at this time in stable condition. Bedside monitors applied to patient upon arrival to PACU. Report received from GI Team at bedside.     1155- patient tolerating sips of ginger ale and bites of nelly crackers at this time     1200- Patient titrated down to 1 L o2 via nasal cannula at this time     1216- Called patient's brother and gave updates at this time     1220- Dr. Teran at bedside speaking with patient at this time. Dr. Teran gave verbal order for a chest x-ray to make sure no pneumothorax is present. Chest x-ray order placed at this time     1226- X-ray at bedside at this time     1229- Patient on RA at this time     1245- Report to NOHEMY Peoples

## 2024-04-04 ASSESSMENT — PAIN SCALES - GENERAL: PAINLEVEL_OUTOF10: 0 - NO PAIN

## 2024-04-05 LAB
BACTERIA SPEC RESP CULT: NORMAL
BACTERIA SPEC RESP CULT: NORMAL
GRAM STN SPEC: NORMAL
LABORATORY COMMENT REPORT: NORMAL
LABORATORY COMMENT REPORT: NORMAL
PATH REPORT.FINAL DX SPEC: NORMAL
PATH REPORT.GROSS SPEC: NORMAL
PATH REPORT.INTRAOP OBS SPEC DOC: NORMAL
PATH REPORT.TOTAL CANCER: NORMAL

## 2024-04-09 DIAGNOSIS — R91.1 LUNG NODULE: ICD-10-CM

## 2024-04-10 LAB
LABORATORY COMMENT REPORT: NORMAL
PATH REPORT.ADDENDUM SPEC: NORMAL
PATH REPORT.COMMENTS IMP SPEC: NORMAL
PATH REPORT.FINAL DX SPEC: NORMAL
PATH REPORT.GROSS SPEC: NORMAL
PATH REPORT.TOTAL CANCER: NORMAL

## 2024-04-22 LAB
FUNGUS SPEC CULT: NORMAL
FUNGUS SPEC CULT: NORMAL
FUNGUS SPEC FUNGUS STN: NORMAL
FUNGUS SPEC FUNGUS STN: NORMAL

## 2024-05-02 ENCOUNTER — OFFICE VISIT (OUTPATIENT)
Dept: PRIMARY CARE | Facility: CLINIC | Age: 64
End: 2024-05-02
Payer: COMMERCIAL

## 2024-05-02 VITALS
HEIGHT: 68 IN | DIASTOLIC BLOOD PRESSURE: 89 MMHG | HEART RATE: 66 BPM | OXYGEN SATURATION: 93 % | WEIGHT: 213 LBS | BODY MASS INDEX: 32.28 KG/M2 | SYSTOLIC BLOOD PRESSURE: 136 MMHG
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
## 2024-05-02 DIAGNOSIS — L03.114 CELLULITIS OF LEFT UPPER EXTREMITY: ICD-10-CM

## 2024-05-02 PROCEDURE — 3079F DIAST BP 80-89 MM HG: CPT | Performed by: NURSE PRACTITIONER

## 2024-05-02 PROCEDURE — 99212 OFFICE O/P EST SF 10 MIN: CPT | Performed by: NURSE PRACTITIONER

## 2024-05-02 PROCEDURE — 3075F SYST BP GE 130 - 139MM HG: CPT | Performed by: NURSE PRACTITIONER

## 2024-05-02 RX ORDER — METHYLPREDNISOLONE 4 MG/1
TABLET ORAL
Qty: 21 TABLET | Refills: 0 | Status: SHIPPED | OUTPATIENT
Start: 2024-05-02 | End: 2024-05-09

## 2024-05-02 RX ORDER — CEPHALEXIN 500 MG/1
500 CAPSULE ORAL 2 TIMES DAILY
Qty: 20 CAPSULE | Refills: 0 | Status: SHIPPED | OUTPATIENT
Start: 2024-05-02 | End: 2024-05-12

## 2024-05-02 NOTE — PROGRESS NOTES
"Fred Menendez is a 63 y.o. male who presents today for a same day sick visit    Chief Complaint   Patient presents with    SDS     Patient coming in today as a SDS for a possible bug bite on his left arm he may have gotten while working in the yard last night. The area is red, swollen and itchy.       Symptoms: Pt reports possible bug bite after working in the yard yesterday   Pt denies SOB      No Known Allergies    Review of Systems  ROS was completed and all systems are negative with the exception of what was noted in the the HPI.       Objective   Vitals:  /89   Pulse 66   Ht 1.727 m (5' 8\")   Wt 96.6 kg (213 lb)   SpO2 93%   BMI 32.39 kg/m²       Current Outpatient Medications   Medication Instructions    amLODIPine (NORVASC) 10 mg, oral, Daily    apixaban (ELIQUIS) 5 mg, oral, 2 times daily    atorvastatin (LIPITOR) 40 mg, oral, Nightly    carvedilol (COREG) 25 mg, oral, 2 times daily    cephalexin (KEFLEX) 500 mg, oral, 2 times daily    chlorthalidone (HYGROTON) 25 mg, oral, Daily    doxazosin (CARDURA) 4 mg, oral, Daily    lisinopril 40 mg, oral, Daily    methylPREDNISolone (Medrol Dospak) 4 mg tablets Take as directed on package.    nicotine (Nicoderm CQ) 14 mg/24 hr patch 1 patch, transdermal, Every 24 hours         Physical Exam  Musculoskeletal:        Arms:       Comments: Erythematous, swollen and warm to touch          Assessment/Plan   Problem List Items Addressed This Visit    None  Visit Diagnoses         Codes    Bug bite, initial encounter    -  Primary W57.XXXA    Relevant Medications    methylPREDNISolone (Medrol Dospak) 4 mg tablets    Cellulitis of left upper extremity     L03.114    Relevant Medications    cephalexin (Keflex) 500 mg capsule                  "

## 2024-05-02 NOTE — PATIENT INSTRUCTIONS
Thank you for seeing me today.  It was a pleasure to meet you!    #BITE, INSECT  Rx Keflex for cellulitis  Rx Medrol dose Avery for swelling  May take OTC Benadryl for itching     For assistance with scheduling referrals or consultations, please call [phone] or [phone].    For laboratory, radiology, and other tests, please call [phone] ([phone] for pediatrics).   If you do not get results within 7-10 days, or you have any questions or concerns, please send a message, call the office ([phone]), or return to the office for a follow-up appointment.     For acute/sick visits, if you are unable to get an office visit, you can do a  On Demand Virtual Visit that is accessible via your My Chart account.  For emergencies, call 9-1-1 or go to the nearest Emergency Department.     Please schedule additional appointment(s) to address concern(s) not addressed today.    Please review prescription inserts and published information for possible adverse effects of all medications.     In general, results are discussed over the phone or via  [employer].     You can see your health information, review clinical summaries from office visits & test results online when you follow your health with MY  Chart, a personal health record.   To sign up go to [email or web address].   If you need assistance with signing up or trouble getting into your account call [employer] Patient Line 24/7 at [phone]

## 2024-05-22 LAB
ACID FAST STN SPEC: NORMAL
ACID FAST STN SPEC: NORMAL
MYCOBACTERIUM SPEC CULT: NORMAL
MYCOBACTERIUM SPEC CULT: NORMAL

## 2024-06-13 ENCOUNTER — HOSPITAL ENCOUNTER (OUTPATIENT)
Dept: RADIOLOGY | Facility: HOSPITAL | Age: 64
Discharge: HOME | End: 2024-06-13
Payer: COMMERCIAL

## 2024-06-13 DIAGNOSIS — R91.1 LUNG NODULE: ICD-10-CM

## 2024-06-13 PROCEDURE — 71250 CT THORAX DX C-: CPT

## 2024-06-18 ENCOUNTER — OFFICE VISIT (OUTPATIENT)
Dept: PULMONOLOGY | Facility: CLINIC | Age: 64
End: 2024-06-18
Payer: COMMERCIAL

## 2024-06-18 VITALS
DIASTOLIC BLOOD PRESSURE: 96 MMHG | WEIGHT: 216 LBS | SYSTOLIC BLOOD PRESSURE: 156 MMHG | BODY MASS INDEX: 32.84 KG/M2 | RESPIRATION RATE: 16 BRPM | HEART RATE: 67 BPM | OXYGEN SATURATION: 96 %

## 2024-06-18 DIAGNOSIS — J98.4 CAVITATING MASS IN LEFT UPPER LUNG LOBE: ICD-10-CM

## 2024-06-18 DIAGNOSIS — R91.8 LUNG MASS: ICD-10-CM

## 2024-06-18 DIAGNOSIS — R91.8 LUNG MASS: Primary | ICD-10-CM

## 2024-06-18 PROCEDURE — 99214 OFFICE O/P EST MOD 30 MIN: CPT | Performed by: INTERNAL MEDICINE

## 2024-06-18 PROCEDURE — 3077F SYST BP >= 140 MM HG: CPT | Performed by: INTERNAL MEDICINE

## 2024-06-18 PROCEDURE — 3080F DIAST BP >= 90 MM HG: CPT | Performed by: INTERNAL MEDICINE

## 2024-06-18 ASSESSMENT — COLUMBIA-SUICIDE SEVERITY RATING SCALE - C-SSRS
1. IN THE PAST MONTH, HAVE YOU WISHED YOU WERE DEAD OR WISHED YOU COULD GO TO SLEEP AND NOT WAKE UP?: NO
2. HAVE YOU ACTUALLY HAD ANY THOUGHTS OF KILLING YOURSELF?: NO
6. HAVE YOU EVER DONE ANYTHING, STARTED TO DO ANYTHING, OR PREPARED TO DO ANYTHING TO END YOUR LIFE?: NO

## 2024-06-18 ASSESSMENT — PAIN SCALES - GENERAL: PAINLEVEL: 0-NO PAIN

## 2024-06-18 ASSESSMENT — ENCOUNTER SYMPTOMS: DEPRESSION: 0

## 2024-06-18 NOTE — PROGRESS NOTES
Department of Medicine  Division of Pulmonary, Critical Care, and Sleep Medicine  Follow-Up Visit  [facility] - [address], [address]    Physician HPI (6/18/2024):  Pleasant 63-year-old man presenting for follow-up on left upper lobe mass/consolidation.  Patient hospitalized in January and treated for bilateral pulmonary embolisms, left upper lobe consolidation versus mass.  Post bronchoscopy in April, all cultures negative, biopsy was also negative.  Patient denied any respiratory complaints.  No fever or chills, no dyspnea, no chest pain, no hemoptysis.    Still smoking but down to 3 cigarettes/day.      Immunization History   Administered Date(s) Administered    Pneumococcal polysaccharide vaccine, 23-valent, age 2 years and older (PNEUMOVAX 23) 01/11/2017, 08/21/2020    Tdap vaccine, age 7 year and older (BOOSTRIX, ADACEL) 07/09/2015, 01/11/2017       Current Medications:  Current Outpatient Medications   Medication Instructions    amLODIPine (NORVASC) 10 mg, oral, Daily    apixaban (ELIQUIS) 5 mg, oral, 2 times daily    atorvastatin (LIPITOR) 40 mg, oral, Nightly    carvedilol (COREG) 25 mg, oral, 2 times daily    chlorthalidone (HYGROTON) 25 mg, oral, Daily    doxazosin (CARDURA) 4 mg, oral, Daily    lisinopril 40 mg, oral, Daily    nicotine (Nicoderm CQ) 14 mg/24 hr patch 1 patch, transdermal, Every 24 hours        Drug Allergies/Intolerances:  No Known Allergies       Visit Vitals  BP (!) 156/96   Pulse 67   Resp 16   Wt 98 kg (216 lb)   SpO2 96%   BMI 32.84 kg/m²   Smoking Status Some Days   BSA 2.17 m²        Physical exam  Constitutional: Normal appearance.  HEENT: Normocephalic and atraumatic.  Cardiovascular: Normal rate and regular rhythm.  Pulmonary: Normal respiratory effort, bilateral clear breath sounds, no wheezing or rhonchi.  Musculoskeletal: No edema, no cyanosis.  Neurological: Awake, alert and oriented x3.  Psychiatric: Normal behavior, mood and affect.      Pulmonary  "Function Test Results     Pulmonary Functions Testing Results:    No results found for: \"FEV1\", \"FVC\", \"RPI2WSZ\", \"TLC\", \"DLCO\"      Chest Radiograph     XR chest 1 view 04/03/2024    Narrative  Interpreted By:  Diallo Garay,  STUDY:  XR CHEST 1 VIEW;  4/3/2024 12:34 pm    INDICATION:  Signs/Symptoms:check for pneumothorax.    COMPARISON:  CT chest 04/02/2024    ACCESSION NUMBER(S):  [id number]    ORDERING CLINICIAN:  ANGELES ZELAYA    FINDINGS:  Left perihilar nodule demonstrates interval central cavitation. No  new infiltrates. No visualized pleural effusion or pneumothorax  visualized. Cardiomediastinal silhouette unchanged. No pulmonary  vascular congestion.    Impression  No pneumothorax identified. Interval central cavitation of left lung  nodule.    MACRO:  None    Signed by: Diallo Garay 4/5/2024 9:03 AM  Dictation workstation:   [id number]      Echocardiogram     No results found for this or any previous visit from the past 365 days.       Chest CT Scan     CT angio chest for pulmonary embolism 01/12/2024    Narrative  STUDY:  CT Angiogram of the Chest; 1/12/2024 11:57 AM  INDICATION:  Mass in left upper lung, hypoxia.  COMPARISON:  XR chest 1/12/2024.  ACCESSION NUMBER(S):  [id number]  ORDERING CLINICIAN:  JOSUE ANDERSON  TECHNIQUE:  CTA of the chest was performed with intravenous contrast.  Images are reviewed and processed at a workstation according to the CT  angiogram protocol with 3-D and/or MIP post processing imaging  generated.  Omnipaque 350--77 mL was administered intravenously.  Automated mA/kV exposure control was utilized and patient examination  was performed in strict accordance with principles of ALARA.  FINDINGS:  Large filling defects noted within the main pulmonary artery  bilaterally extending into first the secondary branches of the upper  lobes and right lower lobe consistent with acute pulmonary embolism.  The thoracic aorta is normal in course and caliber without dissection  or " "aneurysm.  The heart is normal in size with minimal pericardial effusion.  No  evidence of right heart strain.  Thoracic lymph nodes are not  enlarged.  Minimal left pleural effusion.  The airways are patent.  3 cm left upper lobe thin-walled cavity noted containing small amount  of fluid associated with adjacent airspace opacification of the lung.  Mild centrilobular pulmonary emphysema.  Upper abdomen demonstrates no acute pathology.  3.5 cm cyst noted  within hepatic segment 4B as well as an additional 2 cm cyst within  hepatic segment 5.  Mild thickening of both adrenal gland suggestive  of hypertrophic change.  There are no acute fractures.  No suspicious bony lesions.    Impression  Extensive acute bilateral pulmonary embolism without evidence of right  heart strain. 3 cm left upper lobe thin-walled cavity with adjacent  airspace opacification of the lung consistent pneumonia. Pulmonary  emphysema. Minimal left pleural effusion. No evidence of right heart  strain.  Critical findings were discussed with Dr. Kali Chang at 12:30 PM  January 12, 2024.  Signed by Min Weaver MD       Laboratory Studies     Lab Results   Component Value Date    WBC 7.6 03/20/2024    HGB 14.8 03/20/2024    HCT 45.1 03/20/2024    MCV 97 03/20/2024     03/20/2024      Lab Results   Component Value Date    GLUCOSE 97 03/20/2024    CALCIUM 9.5 03/20/2024     03/20/2024    K 4.0 03/20/2024    CO2 30 03/20/2024     03/20/2024    BUN 19 03/20/2024    CREATININE 1.47 (H) 03/20/2024      Lab Results   Component Value Date    ALT 10 02/19/2024    AST 12 02/19/2024    ALKPHOS 90 02/19/2024    BILITOT 0.5 02/19/2024        Sputum Culture     AFB Culture   Date Value Ref Range Status   04/03/2024 No Mycobacteria isolated.  Final   04/03/2024 No Mycobacteria isolated.  Final      No results found for: \"RESPCULTCYFI\"  Susceptibility data from last 90 days.  Collected Specimen Info Organism   04/03/24 Fluid from BRONCHIAL " WASH OF LEFT UPPER LOBE Mixed Microorganisms Resembling Upper Respiratory Amanda            Assessment and Plan / Recommendations     Pleasant 63-year-old man presenting for follow-up on left upper lobe mass/consolidation.  Patient hospitalized in January and treated for bilateral pulmonary embolisms, left upper lobe consolidation versus mass.  Post bronchoscopy in April, all cultures negative, biopsy was also negative.  Patient denied any respiratory complaints.  CT from 6/13 with decreased in size, no new findings.  Will repeat CT chest in 4 to 6-month or sooner if developed any symptoms.  Return to clinic after CT or sooner with any concerns.  Continue Eliquis for bilateral unprovoked PE.    This dictation was created using voice recognition software. Phonetic and/or minor grammatical errors may exist.        Ana Paula Rincon MD   06/18/2024

## 2024-07-18 ENCOUNTER — TELEMEDICINE (OUTPATIENT)
Dept: HEMATOLOGY/ONCOLOGY | Facility: CLINIC | Age: 64
End: 2024-07-18
Payer: COMMERCIAL

## 2024-07-18 DIAGNOSIS — I26.99 PE (PULMONARY THROMBOEMBOLISM) (MULTI): ICD-10-CM

## 2024-07-18 PROCEDURE — 99214 OFFICE O/P EST MOD 30 MIN: CPT | Performed by: PHYSICIAN ASSISTANT

## 2024-07-18 NOTE — PROGRESS NOTES
Visit Type: Benign Heme Follow-up, Virtual Visit:  A Virtual visit (telephone only) between the patient (at the originating site) and the provider (at the distant site) was utilized to provide this telehealth service.   Verbal Consent for Encounter: Verbal consent was requested and obtained from patient, or from parent/guardian if minor, on this date for a telehealth visit.     Reason for Visit  Fred Menendez is a 63 y.o. male, current everyday smoker referred by Dr. Yañez for evaluation and management of B/L pulmonary emboli.    Admitted in 1/2024 with 1 week of dyspnea on exertion and productive cough.  He was hypoxic to 88% on room air.  CT angiogram revealed a left-sided cavitary pneumonia and bilateral pulmonary emboli without CT evidence of right heart strain.  He was started on heparin and antibiotics.  Pulmonology was consulted.  He was transitioned to Eliquis.  His oxygen was weaned to room air at rest, but he did desaturate with exertion so was prescribed home oxygen.  Pulmonology recommended 10 more days of oral Augmentin and repeat CT imaging in 4 weeks to monitor cavitary pneumonia.     On assessment, patient reports no provoking factor such as prolonged immobilization, illness, surgery, aside from smoking. Mother had PE. Continues on Eliquis 5 mg BID. Denies any bleeding or excessive bruising.Had c-scope in 2020, unremarkable. No longer on oxygen. Trying to quite smoking, on Nicotine patch. Otherwise feeing well. Denies f/c/night sweats, n/v/d/abd pain, SOB, CP, neuropathy, rash or any other complaints at this time.     PMH/PSH: HL diagnosed in 1986 treated with MOPP and splenectomy, HTN, HL, Cataracts, BPH.  FH: Mother with PE  Soc Hx: Every day smoker; drinks [place]/coke per night x many years; denies illicit drugs; Manager,at [employer]; single.     History of Present Illness:  S/P bronchoscopy  in April, all cultures negative, biopsy was also negative. Patient denied any respiratory complaints.  Continues on Eliquis w/o complaints.    Review of Systems: All of the systems have been reviewed and are negative for complaints except what is stated in the HPI and/or past medical history.    Allergies and Intolerances:  No Known Allergies         Outpatient Medication Profile:  Current Outpatient Medications   Medication Sig Dispense Refill    amLODIPine (Norvasc) 10 mg tablet Take 1 tablet (10 mg) by mouth once daily. (Patient taking differently: Take 1 tablet (10 mg) by mouth once daily in the morning.) 90 tablet 3    apixaban (Eliquis) 5 mg (74 tabs) tablet Take 1 tablet (5 mg) by mouth 2 times a day. (Patient taking differently: Take 1 tablet (5 mg) by mouth 2 times a day. STOPPED 3/31) 180 tablet 3    atorvastatin (Lipitor) 40 mg tablet Take 1 tablet (40 mg) by mouth once daily at bedtime. 90 tablet 3    carvedilol (Coreg) 25 mg tablet Take 1 tablet (25 mg) by mouth 2 times a day. 180 tablet 3    chlorthalidone (Hygroton) 25 mg tablet Take 1 tablet (25 mg) by mouth once daily. (Patient taking differently: Take 1 tablet (25 mg) by mouth once daily in the morning.) 90 tablet 3    doxazosin (Cardura) 4 mg tablet Take 1 tablet (4 mg) by mouth once daily. (Patient taking differently: Take 1 tablet (4 mg) by mouth once daily in the morning.) 90 tablet 3    lisinopril 20 mg tablet Take 2 tablets (40 mg) by mouth once daily. 180 tablet 3    nicotine (Nicoderm CQ) 14 mg/24 hr patch Place 1 patch over 24 hours on the skin once every 24 hours for 28 days. 28 patch 2     No current facility-administered medications for this visit.        Vitals and Measurements:   There were no vitals taken for this visit.     Physical Exam:   Deferred due to telehealth     Lab Results   Component Value Date    WBC 7.6 03/20/2024    NEUTROABS 3.08 02/19/2024    IGABSOL 0.01 02/19/2024    LYMPHSABS 2.98 02/19/2024    MONOSABS 0.58 02/19/2024    EOSABS 0.45 02/19/2024    BASOSABS 0.02 02/19/2024    RBC 4.67 03/20/2024    MCV 97 03/20/2024  "   MCHC 32.8 03/20/2024    HGB 14.8 03/20/2024    HCT 45.1 03/20/2024     03/20/2024     No results found for: \"RETICCTPCT\"   Lab Results   Component Value Date    CREATININE 1.47 (H) 03/20/2024    BUN 19 03/20/2024    EGFR 54 (L) 03/20/2024     03/20/2024    K 4.0 03/20/2024     03/20/2024    CO2 30 03/20/2024      Lab Results   Component Value Date    ALT 10 02/19/2024    AST 12 02/19/2024    ALKPHOS 90 02/19/2024    BILITOT 0.5 02/19/2024      Lab Results   Component Value Date    TSH 1.13 03/31/2021     Lab Results   Component Value Date    TSH 1.13 03/31/2021     Lab Results   Component Value Date     02/19/2024       Assessment:    63 y.o. male, current everyday smoker referred by Dr. Yañez for evaluation and management of B/L pulmonary embolus. On Eliquis since 1/2024.    HL diagnosed in 1986 treated with MOPP and splenectomy,     CT angiogram revealed a left-sided cavitary pneumonia and bilateral pulmonary emboli without CT evidence of right heart strain     Hypercoagulable work up c/w heterozygous for Prothrombin  Plan:    Reviewed and discussed lab, imaging, and pathology results with patient in detail as well as diagnosis, prognosis, and treatment options.    Lupus Anticoagulant, Protein C and S negative     Discussed life long AC due to unprovoked nature of PE; continue Eliquis 5 mg BID x 1 year total and then decrease to ppx dosing.      F/U with pulmonary for repeat CT imaging and to monitor cavitary pneumonia;      F/U w/PCP    RTC in 6 months      Patient verbalized understanding, and all his questions were answered to his satisfaction     30 min spent with patient greater than 50 % of which was spent in consultation and coordination of care via telehealth.       "

## 2024-12-19 ENCOUNTER — APPOINTMENT (OUTPATIENT)
Dept: PULMONOLOGY | Facility: CLINIC | Age: 64
End: 2024-12-19
Payer: COMMERCIAL

## 2024-12-23 ENCOUNTER — APPOINTMENT (OUTPATIENT)
Dept: PULMONOLOGY | Facility: CLINIC | Age: 64
End: 2024-12-23
Payer: COMMERCIAL

## 2025-03-25 DIAGNOSIS — J18.9 PNEUMONIA OF LEFT UPPER LOBE DUE TO INFECTIOUS ORGANISM: ICD-10-CM

## 2025-03-25 DIAGNOSIS — I26.99 PE (PULMONARY THROMBOEMBOLISM) (MULTI): ICD-10-CM

## 2025-03-25 DIAGNOSIS — E78.2 MODERATE MIXED HYPERLIPIDEMIA NOT REQUIRING STATIN THERAPY: ICD-10-CM

## 2025-03-25 DIAGNOSIS — I10 HYPERTENSION, UNSPECIFIED TYPE: ICD-10-CM

## 2025-03-25 RX ORDER — CHLORTHALIDONE 25 MG/1
25 TABLET ORAL DAILY
Qty: 90 TABLET | Refills: 0 | Status: SHIPPED | OUTPATIENT
Start: 2025-03-25 | End: 2026-03-25

## 2025-03-25 RX ORDER — ATORVASTATIN CALCIUM 40 MG/1
40 TABLET, FILM COATED ORAL NIGHTLY
Qty: 90 TABLET | Refills: 0 | Status: SHIPPED | OUTPATIENT
Start: 2025-03-25 | End: 2026-03-25

## 2025-03-25 RX ORDER — CARVEDILOL 25 MG/1
25 TABLET ORAL 2 TIMES DAILY
Qty: 180 TABLET | Refills: 0 | Status: SHIPPED | OUTPATIENT
Start: 2025-03-25 | End: 2026-03-25

## 2025-03-25 RX ORDER — LISINOPRIL 20 MG/1
40 TABLET ORAL DAILY
Qty: 180 TABLET | Refills: 0 | Status: SHIPPED | OUTPATIENT
Start: 2025-03-25 | End: 2026-03-25

## 2025-03-25 RX ORDER — IBUPROFEN 200 MG
1 TABLET ORAL EVERY 24 HOURS
Qty: 28 PATCH | Refills: 2 | Status: SHIPPED | OUTPATIENT
Start: 2025-03-25 | End: 2025-04-22

## 2025-03-25 RX ORDER — AMLODIPINE BESYLATE 10 MG/1
10 TABLET ORAL DAILY
Qty: 90 TABLET | Refills: 0 | Status: SHIPPED | OUTPATIENT
Start: 2025-03-25 | End: 2026-03-25

## 2025-04-11 ENCOUNTER — TELEPHONE (OUTPATIENT)
Dept: PRIMARY CARE | Facility: CLINIC | Age: 65
End: 2025-04-11
Payer: COMMERCIAL

## 2025-04-11 DIAGNOSIS — I26.99 PE (PULMONARY THROMBOEMBOLISM) (MULTI): ICD-10-CM

## 2025-04-11 NOTE — TELEPHONE ENCOUNTER
Pt called he has a couple pills left of Eliquis unsure if he should be stopping it or needing a refill, can't get a hold of pulmonology.

## 2025-04-18 ENCOUNTER — APPOINTMENT (OUTPATIENT)
Dept: RADIOLOGY | Facility: HOSPITAL | Age: 65
End: 2025-04-18
Payer: COMMERCIAL